# Patient Record
Sex: MALE | Race: WHITE | NOT HISPANIC OR LATINO | URBAN - NONMETROPOLITAN AREA
[De-identification: names, ages, dates, MRNs, and addresses within clinical notes are randomized per-mention and may not be internally consistent; named-entity substitution may affect disease eponyms.]

---

## 2017-01-10 ENCOUNTER — HOSPITAL ENCOUNTER (OUTPATIENT)
Dept: PHYSICAL THERAPY | Facility: HOSPITAL | Age: 57
Setting detail: THERAPIES SERIES
Discharge: HOME | End: 2017-01-20
Attending: ORTHOPAEDIC SURGERY | Admitting: ORTHOPAEDIC SURGERY

## 2017-08-09 ENCOUNTER — OFFICE VISIT (OUTPATIENT)
Dept: CARDIOLOGY | Age: 57
End: 2017-08-09
Payer: COMMERCIAL

## 2017-08-09 VITALS
WEIGHT: 254 LBS | DIASTOLIC BLOOD PRESSURE: 70 MMHG | HEIGHT: 70 IN | BODY MASS INDEX: 36.36 KG/M2 | HEART RATE: 72 BPM | SYSTOLIC BLOOD PRESSURE: 144 MMHG

## 2017-08-09 DIAGNOSIS — I10 ESSENTIAL HYPERTENSION: Primary | ICD-10-CM

## 2017-08-09 DIAGNOSIS — E78.2 MIXED HYPERLIPIDEMIA: ICD-10-CM

## 2017-08-09 PROCEDURE — 99213 OFFICE O/P EST LOW 20 MIN: CPT | Performed by: INTERNAL MEDICINE

## 2018-03-01 ENCOUNTER — TELEPHONE (OUTPATIENT)
Dept: CARDIOLOGY | Age: 58
End: 2018-03-01

## 2018-03-01 NOTE — TELEPHONE ENCOUNTER
LVM; Left vm to remind pt of appt on Friday, 3-2-2018 @ 9:45 am with Helen Dexter for a 6 month follow up.

## 2018-03-02 ENCOUNTER — OFFICE VISIT (OUTPATIENT)
Dept: CARDIOLOGY | Age: 58
End: 2018-03-02
Payer: COMMERCIAL

## 2018-03-02 VITALS
BODY MASS INDEX: 35.98 KG/M2 | HEART RATE: 70 BPM | DIASTOLIC BLOOD PRESSURE: 82 MMHG | WEIGHT: 257 LBS | HEIGHT: 71 IN | SYSTOLIC BLOOD PRESSURE: 138 MMHG

## 2018-03-02 DIAGNOSIS — I10 ESSENTIAL HYPERTENSION: Primary | ICD-10-CM

## 2018-03-02 PROCEDURE — 99213 OFFICE O/P EST LOW 20 MIN: CPT | Performed by: CLINICAL NURSE SPECIALIST

## 2018-03-02 PROCEDURE — 93000 ELECTROCARDIOGRAM COMPLETE: CPT | Performed by: CLINICAL NURSE SPECIALIST

## 2018-03-02 NOTE — PROGRESS NOTES
[facility], [address] [place] [address]  [address]  Phone: [phone]  Fax: [phone]    OFFICE VISIT:  3/2/2018    Marylen Rakes - : 1960    Reason For Visit:  Ebony Cristina is a 59 y.o. male who is here for 6 Month Follow-Up (No cardiac symptoms) and Hypertension  Negative stress echo in . Echo at that time showed mild diastolic dysfunction with normal LV systolic function. Mildly thickened mitral and aortic valve leaflets with preserved mobility    Subjective  Ebony Cristina denies exertional chest pain, shortness of breath, orthopnea, paroxysmal nocturnal dyspnea, syncope, presyncope, arrhythmia, edema and fatigue. The patient denies numbness or weakness to suggest cerebrovascular accident or transient ischemic attack. Wife is checked blood pressure over the last few days. 130s to 140s/ 70-80  He had a couple morning readings of 150s but came back down to normal  Thurabdulaziz Lee is PCP and follows labs. Marylen Rakes has the following history as recorded in [facility]:    Patient Active Problem List    Diagnosis Date Noted    Hypertension      Past Medical History:   Diagnosis Date    Dyspnea     at rest and with exertion     Hypertension     SOB (shortness of breath)      History reviewed. No pertinent surgical history.   Family History   Problem Relation Age of Onset    Heart Attack Father      x 3    High Cholesterol Father     Hypertension Father     Heart Attack Paternal Uncle      Social History   Substance Use Topics    Smoking status: Former Smoker    Smokeless tobacco: Former User     Quit date: 1986      Comment: quit 30 yrs ago    Alcohol use No      Current Outpatient Prescriptions   Medication Sig Dispense Refill    metoprolol (LOPRESSOR) 25 MG tablet Take 0.5 tablets by mouth 2 times daily 30 tablet 3    aspirin EC 81 MG EC tablet Take 1 tablet by mouth daily 30 tablet 3    simvastatin (ZOCOR) 10 MG tablet Take 10 mg by mouth nightly

## 2018-03-02 NOTE — PATIENT INSTRUCTIONS
Follow up in August With Dr. Cinthia Troy  Monitor blood pressure, goal at rest was 130s/80s  Call with any questions or concerns  Follow up with Ravindra De Souza for non cardiac problems  Report any new problems  Cardiovascular Fitness-Exercise as tolerated. Strive for 15 minutes of exercise most days of the week. Cardiac / Healthy Diet  Continue current medications as directed  Continue plan of treatment  It is always recommended that you bring your medications bottles with you to each visit - this is for your safety!

## 2018-07-02 ENCOUNTER — TELEPHONE (OUTPATIENT)
Dept: CARDIOLOGY | Age: 58
End: 2018-07-02

## 2018-07-02 NOTE — TELEPHONE ENCOUNTER
Unable to reach pt or leave a msg in regards to Dr Prem Kelly no longer in office and needing to reschedule w [facility] for a different day.  Will try to contact pt back the following day 7-3-2018  if pt does not call back.  phani

## 2018-07-03 ENCOUNTER — TELEPHONE (OUTPATIENT)
Dept: CARDIOLOGY | Age: 58
End: 2018-07-03

## 2018-07-03 NOTE — TELEPHONE ENCOUNTER
Unable to leave msg on pt phone. Called pt in regards to appt jose roberto Smith on 8-8-2018 needing to be rescheduled w PENNY due to [facility].  If pt does not call back today on 7-3-2018 will call pt back on 7-5-2018  phani

## 2018-07-09 ENCOUNTER — TELEPHONE (OUTPATIENT)
Dept: CARDIOLOGY | Age: 58
End: 2018-07-09

## 2018-08-16 ENCOUNTER — OFFICE VISIT (OUTPATIENT)
Dept: CARDIOLOGY | Age: 58
End: 2018-08-16
Payer: COMMERCIAL

## 2018-08-16 VITALS
SYSTOLIC BLOOD PRESSURE: 134 MMHG | DIASTOLIC BLOOD PRESSURE: 87 MMHG | HEIGHT: 71 IN | BODY MASS INDEX: 35.98 KG/M2 | HEART RATE: 68 BPM | WEIGHT: 257 LBS

## 2018-08-16 DIAGNOSIS — I10 ESSENTIAL HYPERTENSION: Primary | ICD-10-CM

## 2018-08-16 PROCEDURE — 99213 OFFICE O/P EST LOW 20 MIN: CPT | Performed by: CLINICAL NURSE SPECIALIST

## 2018-08-16 NOTE — PROGRESS NOTES
[facility], [address], [address] [address]  Phone: [phone]  Fax: [phone]    OFFICE VISIT:  2018    Kathy Russo - : 1960    Reason For Visit:  Radha Zapata is a 59 y.o. male who is here for Follow-up and Hypertension (no cardiac symtpoms today)  Negative stress echo in 2016. Echo at that time showed mild diastolic dysfunction with normal LV systolic function. Mildly thickened mitral and aortic valve leaflets with preserved mobility    Subjective  Radha Zapata denies exertional chest pain, shortness of breath, orthopnea, paroxysmal nocturnal dyspnea, syncope, presyncope, arrhythmia, edema and fatigue. The patient denies numbness or weakness to suggest cerebrovascular accident or transient ischemic attack. Sanket Alegria is PCP and follows labs including lipids. Kathy Russo has the following history as [facility] in [facility]:    Patient Active Problem List    Diagnosis Date Noted    Hypertension      Past Medical History:   Diagnosis Date    Dyspnea     at rest and with exertion     Hypertension     SOB (shortness of breath)      History reviewed. No pertinent surgical history. Family History   Problem Relation Age of Onset    Heart Attack Father         x 3    High Cholesterol Father     Hypertension Father     Heart Attack Paternal Uncle      Social History   Substance Use Topics    Smoking status: Former Smoker    Smokeless tobacco: Former User     Quit date: 1986      Comment: quit 30 yrs ago    Alcohol use No      Current Outpatient Prescriptions   Medication Sig Dispense Refill    metoprolol tartrate (LOPRESSOR) 25 MG tablet Take 0.5 tablets by mouth 2 times daily 90 tablet 3    aspirin EC 81 MG EC tablet Take 1 tablet by mouth daily 30 tablet 3    simvastatin (ZOCOR) 10 MG tablet Take 10 mg by mouth nightly       No current facility-administered medications for this visit.       Allergies: Patient has no known allergies. Review of Systems  Constitutional - no significant activity change, appetite change, or unexpected weight change. No fever, chills or diaphoresis. No fatigue. HEENT - no significant rhinorrhea or epistaxis. No tinnitus or significant hearing loss. Eyes - no sudden vision change or amaurosis. Respiratory - no significant wheezing, stridor, apnea or cough. No dyspnea on exertion or shortness of breath. Cardiovascular - no exertional chest pain, orthopnea or PND. No sensation of arrhythmia or slow heart rate. No claudication or leg edema. Gastrointestinal - no abdominal swelling or pain. No blood in stool. No severe constipation, diarrhea, nausea, or vomiting. Genitourinary - no difficulty urinating, dysuria, frequency, or urgency. No flank pain or hematuria. Musculoskeletal - no back pain, gait disturbance, or myalgia. Skin - no color change or rash. No pallor. No new surgical incision. Neurologic - no speech difficulty, facial asymmetry or lateralizing weakness. No seizures, presyncope, syncope, or significant dizziness. Hematologic - no easy bruising or excessive bleeding. Psychiatric - no severe anxiety or insomnia. No confusion. All other review of systems are negative. Objective  Vital Signs - /87   Pulse 68   Ht 5' 11\" (1.803 m)   Wt 257 lb (116.6 kg)   BMI 35.84 kg/m²   General - Kenny Helling is alert, cooperative, and pleasant. Well groomed. No acute distress. Body habitus is overweight. HEENT - The head is normocephalic. No circumoral cyanosis. Dentition is normal.   EYES -  No Xanthelasma, no arcus senilis, no conjunctival hemorrhages or discharge. Neck - Supple, without increased jugular venous pressures. No carotid bruits. No mass. Respiratory - Lungs are clear bilaterally. No wheezes or rales. Normal effort without use of accessory muscles. Cardiovascular - Heart has regular rhythm and rate. No murmurs, rubs or gallops.     + pedal pulses and no varicosities. Abdominal -  Soft, nontender, nondistended. Bowel sounds are intact. Extremities - No clubbing, cyanosis, or  edema. Musculoskeletal -  No clubbing . No Osler's nodes. Gait normal .  No kyphosis or scoliosis. Skin -  no statis ulcers or dermatitis. Neurological - No focal signs are identified. Oriented to person, place and time. Psychiatric -  Appropriate affect and mood. Assessment:     Diagnosis Orders   1. Essential hypertension       Data:  BP Readings from Last 3 Encounters:   08/16/18 134/87   03/02/18 138/82   08/09/17 (!) 144/70    Pulse Readings from Last 3 Encounters:   08/16/18 68   03/02/18 70   08/09/17 72        Blood pressure heart rate well controlled. Medical management included low-dose beta blocker, statin and aspirin   States taking medications as prescribed  Stable cardiovascular status. No evidence of overt heart failure, angina or dysrhythmia. Plan    Follow up in 1 year  Call with any questions or concerns  Follow up with Dg Fragmin for non cardiac problems  Report any new problems  Cardiovascular Fitness-Exercise as tolerated. Strive for 30 minutes of exercise most days of the week. Cardiac / Healthy Diet  Continue current medications as directed  Continue plan of treatment  It is always recommended that you bring your medications bottles with you to each visit - this is for your safety!        SHAMIKA Verduzco

## 2019-04-19 ENCOUNTER — TELEPHONE (OUTPATIENT)
Dept: CARDIOLOGY | Age: 59
End: 2019-04-19

## 2019-04-19 NOTE — TELEPHONE ENCOUNTER
Left message for patient to return call if they would be interested in scheduling their Cardiology appointment at the [facility]

## 2019-08-15 ENCOUNTER — OFFICE VISIT (OUTPATIENT)
Dept: CARDIOLOGY | Age: 59
End: 2019-08-15
Payer: COMMERCIAL

## 2019-08-15 VITALS
HEIGHT: 71 IN | SYSTOLIC BLOOD PRESSURE: 128 MMHG | HEART RATE: 67 BPM | WEIGHT: 253 LBS | BODY MASS INDEX: 35.42 KG/M2 | DIASTOLIC BLOOD PRESSURE: 84 MMHG

## 2019-08-15 DIAGNOSIS — I10 ESSENTIAL HYPERTENSION: Primary | ICD-10-CM

## 2019-08-15 DIAGNOSIS — I25.84 CORONARY ARTERY CALCIFICATION: ICD-10-CM

## 2019-08-15 DIAGNOSIS — Z82.49 FAMILY HISTORY OF EARLY CAD: ICD-10-CM

## 2019-08-15 DIAGNOSIS — I70.90 ATHEROSCLEROSIS OF ARTERIES: ICD-10-CM

## 2019-08-15 DIAGNOSIS — I25.10 CORONARY ARTERY CALCIFICATION: ICD-10-CM

## 2019-08-15 PROCEDURE — 93000 ELECTROCARDIOGRAM COMPLETE: CPT | Performed by: CLINICAL NURSE SPECIALIST

## 2019-08-15 PROCEDURE — 99214 OFFICE O/P EST MOD 30 MIN: CPT | Performed by: CLINICAL NURSE SPECIALIST

## 2019-08-15 NOTE — PROGRESS NOTES
or rales. Normal effort without use of accessory muscles. Cardiovascular - Heart has regular rhythm and rate. No murmurs, rubs or gallops. + pedal pulses and no varicosities. Abdominal -  Soft, nontender, nondistended. Bowel sounds are intact. Extremities - No clubbing, cyanosis, or  edema. Musculoskeletal -  No clubbing . No Osler's nodes. Gait normal .  No kyphosis or scoliosis. Skin -  no statis ulcers or dermatitis. Neurological - No focal signs are identified. Oriented to person, place and time. Psychiatric -  Appropriate affect and mood. Assessment:     Diagnosis Orders   1. Essential hypertension  EKG 12 lead    NM MYOCARDIAL SPECT REST EXERCISE OR RX   2. Atherosclerosis of arteries  NM MYOCARDIAL SPECT REST EXERCISE OR RX   3. Coronary artery calcification  NM MYOCARDIAL SPECT REST EXERCISE OR RX   4. Family history of early CAD  NM MYOCARDIAL SPECT REST EXERCISE OR RX     Data:  BP Readings from Last 3 Encounters:   08/15/19 128/84   08/16/18 134/87   03/02/18 138/82    Pulse Readings from Last 3 Encounters:   08/15/19 67   08/16/18 68   03/02/18 70        Wt Readings from Last 3 Encounters:   08/15/19 253 lb (114.8 kg)   08/16/18 257 lb (116.6 kg)   03/02/18 257 lb (116.6 kg)     EKG today shows a rhythm with a rate of 67. Blood pressure and heart rate are well controlled. Medical management includes beta-blocker, aspirin and statin    Patient had CT of chest and abdomen last year for some abdominal pain. Atherosclerosis what was moderately severe was noted. Significant risk factors for coronary disease including hypertension, hyperlipidemia and family history. Dad had MI in his 45s. Has been several years since we have done an ischemic study. We will get him set up for a nuclear treadmill. Answered all questions. We discussed the importance of staying active and letting us know if he has any change of activity tolerance or any symptoms.   Recommend obtaining

## 2019-08-15 NOTE — PATIENT INSTRUCTIONS
heart's activity. Your blood pressure and heart rate will be checked often. A small amount of chemical will be injected through the IV and into your body. Depending on which chemical is used, your heart will beat faster and/or the blood vessels near your heart will open wider. An ECG may also be done at this time. If you have chest pain, trouble breathing, dizziness, or any other symptoms, tell your doctor or technician right away. The test may need to be stopped. Changes in the ECG may also be a reason to stop the test.     If you are getting nuclear imaging, the technician will inject a mildly radioactive chemical through your IV. Thirty to sixty minutes after the chemical injection, a special camera or an MRI will be used to track the flow of the chemical through and around your heart. Images will be taken to find areas of the heart that may not be getting enough blood or are blocked. If you are getting a stress echocardiogram, an ultrasound will be taken at specific time points. The doctor will compare the pictures of your heart under stress with pictures of your heart at rest.     After Test   Your blood pressure, heart rate, and ECG will be monitored until levels return to normal. You will be able to leave after the test is done. How Long Will It Take? Typically takes 3-4 hours to complete (may be done over 1-2 days)     Will It Hurt? No, you should not feel pain during the test. You may feel a pinch when the IV is inserted. You may also feel a flushing sensation when the medicine is injected. Your doctor may discuss some of the results on the same day as the test. It may take 2-3 days for the full results to be ready.    One or more of the following are considered a positive stress test:   ECG changes that show low oxygen supply to the heart   Significant blood pressure drops or rhythm changes   You have chest pain or trouble breathing, especially if linked with ECG changes   Stress test pictures that show areas of your heart having low blood flow or abnormal movements     A positive test may mean that you have CAD. Not everyone tests positive for it. Based on your results, your doctor may recommend more tests or care. After the test, call your doctor if any of the following occurs:   Chest pain   Shortness of breath   Racing or irregular heart beat   Dizziness or weakness   Any other unusual symptoms or concerns     If you think you have an emergency, CALL 911 . Please be at [facility] for your stress test.  If you need to change this appointment, please call [phone] to reschedule.

## 2019-09-11 ENCOUNTER — HOSPITAL ENCOUNTER (OUTPATIENT)
Dept: NON INVASIVE DIAGNOSTICS | Age: 59
Discharge: HOME | End: 2019-09-11
Payer: COMMERCIAL

## 2019-09-11 ENCOUNTER — HOSPITAL ENCOUNTER (OUTPATIENT)
Dept: NUCLEAR MEDICINE | Age: 59
Discharge: HOME | End: 2019-09-13
Payer: COMMERCIAL

## 2019-09-11 DIAGNOSIS — I10 ESSENTIAL HYPERTENSION: ICD-10-CM

## 2019-09-11 DIAGNOSIS — I25.84 CORONARY ARTERY CALCIFICATION: ICD-10-CM

## 2019-09-11 DIAGNOSIS — I25.10 CORONARY ARTERY CALCIFICATION: ICD-10-CM

## 2019-09-11 DIAGNOSIS — I70.90 ATHEROSCLEROSIS OF ARTERIES: ICD-10-CM

## 2019-09-11 DIAGNOSIS — Z82.49 FAMILY HISTORY OF EARLY CAD: ICD-10-CM

## 2019-09-11 PROCEDURE — 3430000000 HC RX DIAGNOSTIC RADIOPHARMACEUTICAL: Performed by: CLINICAL NURSE SPECIALIST

## 2019-09-11 PROCEDURE — A9500 TC99M SESTAMIBI: HCPCS | Performed by: CLINICAL NURSE SPECIALIST

## 2019-09-11 PROCEDURE — 78452 HT MUSCLE IMAGE SPECT MULT: CPT

## 2019-09-11 PROCEDURE — 93017 CV STRESS TEST TRACING ONLY: CPT

## 2019-09-11 RX ADMIN — TETRAKIS(2-METHOXYISOBUTYLISOCYANIDE)COPPER(I) TETRAFLUOROBORATE 30 MILLICURIE: 1 INJECTION, POWDER, LYOPHILIZED, FOR SOLUTION INTRAVENOUS at 16:25

## 2019-09-11 RX ADMIN — TETRAKIS(2-METHOXYISOBUTYLISOCYANIDE)COPPER(I) TETRAFLUOROBORATE 10 MILLICURIE: 1 INJECTION, POWDER, LYOPHILIZED, FOR SOLUTION INTRAVENOUS at 16:24

## 2019-09-13 LAB
LV EF: 71 %
LVEF MODALITY: NORMAL

## 2019-09-16 ENCOUNTER — TELEPHONE (OUTPATIENT)
Dept: CARDIOLOGY | Age: 59
End: 2019-09-16

## 2020-08-19 ENCOUNTER — OFFICE VISIT (OUTPATIENT)
Dept: CARDIOLOGY | Age: 60
End: 2020-08-19
Payer: COMMERCIAL

## 2020-08-19 VITALS
WEIGHT: 254 LBS | DIASTOLIC BLOOD PRESSURE: 80 MMHG | HEIGHT: 71 IN | BODY MASS INDEX: 35.56 KG/M2 | HEART RATE: 80 BPM | SYSTOLIC BLOOD PRESSURE: 130 MMHG

## 2020-08-19 PROCEDURE — 99214 OFFICE O/P EST MOD 30 MIN: CPT | Performed by: INTERNAL MEDICINE

## 2020-08-19 RX ORDER — METOPROLOL SUCCINATE 50 MG/1
50 TABLET, EXTENDED RELEASE ORAL DAILY
Qty: 90 TABLET | Refills: 3 | Status: SHIPPED | OUTPATIENT
Start: 2020-08-19

## 2020-08-19 ASSESSMENT — ENCOUNTER SYMPTOMS
COUGH: 0
SHORTNESS OF BREATH: 0
ABDOMINAL PAIN: 0
DIARRHEA: 0
VOMITING: 0
ABDOMINAL DISTENTION: 0
BACK PAIN: 0
WHEEZING: 0
BLOOD IN STOOL: 0

## 2020-08-19 NOTE — PROGRESS NOTES
Laterality Date    EYE SURGERY Right        Medications:  Current Outpatient Medications   Medication Sig Dispense Refill    metoprolol succinate (TOPROL XL) 50 MG extended release tablet Take 1 tablet by mouth daily 90 tablet 3    aspirin EC 81 MG EC tablet Take 1 tablet by mouth daily 30 tablet 3    simvastatin (ZOCOR) 10 MG tablet Take 10 mg by mouth nightly       No current facility-administered medications for this visit. Allergies:  Patient has no known allergies.     Past Social History:  Social History     Socioeconomic History    Marital status:      Spouse name: Not on file    Number of children: Not on file    Years of education: Not on file    Highest education level: Not on file   Occupational History    Not on file   Social Needs    Financial resource strain: Not on file    Food insecurity     Worry: Not on file     Inability: Not on file    Transportation needs     Medical: Not on file     Non-medical: Not on file   Tobacco Use    Smoking status: Former Smoker    Smokeless tobacco: Former User     Quit date: 6/29/1986    Tobacco comment: quit 30 yrs ago   Substance and Sexual Activity    Alcohol use: No    Drug use: No    Sexual activity: Not Currently   Lifestyle    Physical activity     Days per week: Not on file     Minutes per session: Not on file    Stress: Not on file   Relationships    Social connections     Talks on phone: Not on file     Gets together: Not on file     Attends Yazidi service: Not on file     Active member of club or organization: Not on file     Attends meetings of clubs or organizations: Not on file     Relationship status: Not on file    Intimate partner violence     Fear of current or ex partner: Not on file     Emotionally abused: Not on file     Physically abused: Not on file     Forced sexual activity: Not on file   Other Topics Concern    Not on file   Social History Narrative    Not on file       Family History:       Problem Relation Age of Onset    Heart Attack Father 36        x 3    High Cholesterol Father     Hypertension Father     Heart Attack Paternal Uncle          Physical Examination:  /80   Pulse 80   Ht 5' 11\" (1.803 m)   Wt 254 lb (115.2 kg)   BMI 35.43 kg/m²   Physical Exam  Constitutional:       Appearance: He is well-developed. Comments: Moderate truncal obesity  Initial blood pressure 160/100 mmHg  Repeat blood pressure 140/90 mmHg  Pulse 76 bpm regular   HENT:      Mouth/Throat:      Pharynx: No oropharyngeal exudate. Eyes:      General: No scleral icterus. Right eye: No discharge. Left eye: No discharge. Neck:      Thyroid: No thyromegaly. Vascular: No JVD. Cardiovascular:      Rate and Rhythm: Normal rate and regular rhythm. Heart sounds: No murmur. No friction rub. No gallop. Comments: No JVD  No edema  No significant systolic or diastolic murmurs appreciated  No gallop noted  Pulmonary:      Effort: No respiratory distress. Breath sounds: No stridor. No wheezing or rales. Comments: Good air entry bilaterally with no crepitations or wheezes  Abdominal:      General: Bowel sounds are normal. There is no distension. Palpations: Abdomen is soft. There is no mass. Tenderness: There is no abdominal tenderness. There is no guarding or rebound. Comments: No palpable organomegaly   Musculoskeletal:         General: No deformity. Skin:     General: Skin is warm. Coloration: Skin is not pale. Findings: No erythema or rash. Neurological:      Mental Status: He is alert and oriented to person, place, and time. Motor: No abnormal muscle tone. Coordination: Coordination normal.      Deep Tendon Reflexes: Reflexes normal.           Labs:   CBC: No results for input(s): WBC, HGB, HCT, PLT in the last 72 hours. BMP:No results for input(s): NA, K, CO2, BUN, CREATININE, LABGLOM, GLUCOSE in the last 72 hours.   BNP: No results for input(s): BNP in the last 72 hours. PT/INR: No results for input(s): PROTIME, INR in the last 72 hours. APTT:No results for input(s): APTT in the last 72 hours. CARDIAC ENZYMES:No results for input(s): CKTOTAL, CKMB, CKMBINDEX, TROPONINI in the last 72 hours. FASTING LIPID PANEL:No results found for: HDL, LDLDIRECT, LDLCALC, TRIG  LIVER PROFILE:No results for input(s): AST, ALT, LABALBU in the last 72 hours. Imaging:          ASSESSMENT and PLAN:    77-year-old gentleman with past medical history of hypertension, normal LV ejection fraction, family history of premature CAD presenting for follow-up evaluation. 1.  His blood pressure appears slightly elevated on this visit. He is taking metoprolol tartrate 12.5 mg once daily. I have switched this to Toprol-XL 50 mg once daily. If he does notice that his pressures are low or he is fatigued he may reduce to 25 mg once daily. 2.  Continue statin therapy. 3.  He will follow-up with me in 1 year. Orders:  No orders of the defined types were placed in this encounter. Orders Placed This Encounter   Medications    metoprolol succinate (TOPROL XL) 50 MG extended release tablet     Sig: Take 1 tablet by mouth daily     Dispense:  90 tablet     Refill:  3             Return in about 1 year (around 8/19/2021). Electronically signed by Sarah Calvin MD on 8/19/2020 at 9:20 [address] [facility]      Thisdictation was generated by voice recognition computer software. Although all attempts are made to edit the dictation for accuracy, there may be errors in the transcription that are not intended.

## 2021-08-20 ENCOUNTER — OFFICE VISIT (OUTPATIENT)
Dept: CARDIOLOGY CLINIC | Age: 61
End: 2021-08-20
Payer: COMMERCIAL

## 2021-08-20 VITALS
HEIGHT: 71 IN | BODY MASS INDEX: 35.14 KG/M2 | SYSTOLIC BLOOD PRESSURE: 138 MMHG | WEIGHT: 251 LBS | HEART RATE: 68 BPM | DIASTOLIC BLOOD PRESSURE: 80 MMHG

## 2021-08-20 DIAGNOSIS — Z82.49 FAMILY HISTORY OF EARLY CAD: ICD-10-CM

## 2021-08-20 DIAGNOSIS — I10 ESSENTIAL HYPERTENSION: Primary | ICD-10-CM

## 2021-08-20 PROCEDURE — 99213 OFFICE O/P EST LOW 20 MIN: CPT | Performed by: CLINICAL NURSE SPECIALIST

## 2021-08-20 PROCEDURE — 93000 ELECTROCARDIOGRAM COMPLETE: CPT | Performed by: CLINICAL NURSE SPECIALIST

## 2021-08-20 NOTE — PROGRESS NOTES
[address] [address], [address] [address]  Phone: [phone]  Fax: [phone]    OFFICE VISIT:  2021    Lilly [place] - : 1960    Reason For Visit:  Nelson Kocher is a 56 y.o. male who is here for Follow-up (no cardiac symptoms) and Hypertension  1. Hypertension, negative stress test 2019, normal LV ejection fraction. 2.  Family history of premature CAD involving his father with MI in his 45s. Last year beta-blocker was uptitrated due to some hypertension. He returns today for follow-up  As he is active and doing well. 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 No change in stamina or activity tolerance. Subjective  Nelson Kocher denies exertional chest pain, shortness of breath, orthopnea, paroxysmal nocturnal dyspnea, syncope, presyncope, arrhythmia, edema and fatigue. The patient denies numbness or weakness to suggest cerebrovascular accident or transient ischemic attack. Brown Cunha is PCP and follows labs including lipids. States have been well controlled.   Lilly [place] [facility] the [email or web address]:    Patient Active Problem List    Diagnosis Date Noted    Hypertension      Past Medical History:   Diagnosis Date    Dyspnea     at rest and with exertion     Hypertension     SOB (shortness of breath)      Past Surgical History:   Procedure Laterality Date    EYE SURGERY Right      Family History   Problem Relation Age of Onset    Heart Attack Father 36        x 3    High Cholesterol Father     Hypertension Father     Heart Attack Paternal Uncle      Social History     Tobacco Use    Smoking status: Former Smoker    Smokeless tobacco: Former User     Quit date: 1986    Tobacco comment: quit 30 yrs ago   Substance Use Topics    Alcohol use: No      Current Outpatient Medications   Medication Sig Dispense Refill    metoprolol succinate (TOPROL XL) 50 MG extended release tablet Take 1 tablet by mouth daily 90 tablet 3    aspirin EC 81 MG EC tablet Take 1 tablet by mouth daily 30 tablet 3    simvastatin (ZOCOR) 10 MG tablet Take 10 mg by mouth nightly       No current facility-administered medications for this visit. Allergies: Patient has no known allergies. Review of Systems  Constitutional - no significant activity change, appetite change, or unexpected weight change. No fever, chills or diaphoresis. No fatigue. HEENT - no significant rhinorrhea or epistaxis. No tinnitus or significant hearing loss. Eyes - no sudden vision change or amaurosis. Respiratory - no significant wheezing, stridor, apnea or cough. No dyspnea on exertion or shortness of breath. Cardiovascular - no exertional chest pain, orthopnea or PND. No sensation of arrhythmia or slow heart rate. No claudication or leg edema. Gastrointestinal - no abdominal swelling or pain. No blood in stool. No severe constipation, diarrhea, nausea, or vomiting. Genitourinary - no difficulty urinating, dysuria, frequency, or urgency. No flank pain or hematuria. Musculoskeletal - no back pain, gait disturbance, or myalgia. Skin - no color change or rash. No pallor. No new surgical incision. Neurologic - no speech difficulty, facial asymmetry or lateralizing weakness. No seizures, presyncope, syncope, or significant dizziness. Hematologic - no easy bruising or excessive bleeding. Psychiatric - no severe anxiety or insomnia. No confusion. All other review of systems are negative. Objective  Vital Signs - /80   Pulse 68   Ht 5' 11\" (1.803 m)   Wt 251 lb (113.9 kg)   BMI 35.01 kg/m²   General - Alisha Camp is alert, cooperative, and pleasant. Well groomed. No acute distress. Body habitus is overweight. HEENT - The head is normocephalic. No circumoral cyanosis. Dentition is normal.   EYES -  No Xanthelasma, no arcus senilis, no conjunctival hemorrhages or discharge. Neck - Supple, without increased jugular venous pressures.   No carotid Diet  Continue current medications as directed  Continue plan of treatment  It is always recommended that you bring your medications bottles with you to each visit - this is for your safety! SHAMIKA Ramirez dragon/transcription disclaimer: Much of this encounter note is electronic transcription/translation of spoken language to printed tach. Electronic translation of spoken language may be erroneous, or at times, nonsensical words or phrases may be inadvertently transcribed.  Although, I have reviewed the note for such errors, some may still exist.

## 2022-08-24 ENCOUNTER — OFFICE VISIT (OUTPATIENT)
Dept: CARDIOLOGY CLINIC | Age: 62
End: 2022-08-24
Payer: COMMERCIAL

## 2022-08-24 VITALS
BODY MASS INDEX: 35 KG/M2 | WEIGHT: 250 LBS | HEART RATE: 72 BPM | HEIGHT: 71 IN | DIASTOLIC BLOOD PRESSURE: 64 MMHG | SYSTOLIC BLOOD PRESSURE: 118 MMHG

## 2022-08-24 DIAGNOSIS — I10 ESSENTIAL HYPERTENSION: Primary | ICD-10-CM

## 2022-08-24 PROCEDURE — 99213 OFFICE O/P EST LOW 20 MIN: CPT | Performed by: INTERNAL MEDICINE

## 2022-08-24 ASSESSMENT — ENCOUNTER SYMPTOMS
WHEEZING: 0
SHORTNESS OF BREATH: 0
ABDOMINAL DISTENTION: 0
COUGH: 0
VOMITING: 0
ABDOMINAL PAIN: 0
BLOOD IN STOOL: 0
BACK PAIN: 0
DIARRHEA: 0

## 2022-08-24 NOTE — PROGRESS NOTES
[facility]  Cardiology Office Note  [address] [id number]  Phone: [phone]  Fax: [phone]                            Date:  8/24/2022  Patient: Marjan Martinez  Age:  59 y.o., 1960    Referral: No ref. provider found      PROBLEM LIST:    Patient Active Problem List    Diagnosis Date Noted    Hypertension      Priority: Low     1. Hypertension, negative stress test 9/2019, normal LV ejection fraction. 2.  Family history of premature CAD involving his father with MI in his 45s. PRESENTATION: Marjan Martinez is a 59y.o. year old male presents for follow-up evaluation. He has been doing well with no issues over the last year. 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 He does have an air conditioned tractor as he is out in the heat. His weight has been fairly stable. Dietary portions could be reduced. No leg swelling, chest pain or shortness of breath. REVIEW OF SYSTEMS:  Review of Systems   Constitutional:  Negative for activity change, diaphoresis and fatigue. HENT:  Negative for hearing loss, nosebleeds and tinnitus. Eyes:  Negative for visual disturbance. Respiratory:  Negative for cough, shortness of breath and wheezing. Cardiovascular:  Negative for chest pain, palpitations and leg swelling. Gastrointestinal:  Negative for abdominal distention, abdominal pain, blood in stool, diarrhea and vomiting. Endocrine: Negative for cold intolerance, heat intolerance, polydipsia, polyphagia and polyuria. Genitourinary:  Negative for difficulty urinating, flank pain and hematuria. Musculoskeletal:  Negative for arthralgias, back pain, joint swelling and myalgias. Skin:  Negative for pallor and rash. Neurological:  Negative for dizziness, seizures, syncope and headaches. Psychiatric/Behavioral:  Negative for behavioral problems and dysphoric mood. The patient is not nervous/anxious.       Past Medical History:      Diagnosis Date    Dyspnea     at rest and with exertion     Hypertension     SOB (shortness of breath)        Past Surgical History:      Procedure Laterality Date    EYE SURGERY Right        Medications:  Current Outpatient Medications   Medication Sig Dispense Refill    metoprolol succinate (TOPROL XL) 50 MG extended release tablet Take 1 tablet by mouth daily 90 tablet 3    aspirin EC 81 MG EC tablet Take 1 tablet by mouth daily 30 tablet 3    simvastatin (ZOCOR) 10 MG tablet Take 10 mg by mouth nightly       No current facility-administered medications for this visit. Allergies:  Patient has no known allergies.     Past Social History:  Social History     Socioeconomic History    Marital status:      Spouse name: Not on file    Number of children: Not on file    Years of education: Not on file    Highest education level: Not on file   Occupational History    Not on file   Tobacco Use    Smoking status: Former    Smokeless tobacco: Former     Quit date: 6/29/1986    Tobacco comments:     quit 30 yrs ago   Vaping Use    Vaping Use: Never used   Substance and Sexual Activity    Alcohol use: No    Drug use: No    Sexual activity: Not Currently   Other Topics Concern    Not on file   Social History Narrative    Not on file     Social Determinants of Health     Financial Resource Strain: Not on file   Food Insecurity: Not on file   Transportation Needs: Not on file   Physical Activity: Not on file   Stress: Not on file   Social Connections: Not on file   Intimate Partner Violence: Not on file   Housing Stability: Not on file       Family History:       Problem Relation Age of Onset    Heart Attack Father 40        x 3    High Cholesterol Father     Hypertension Father     Heart Attack Paternal Uncle          Physical Examination:  /64   Pulse 72   Ht 5' 11\" (1.803 m)   Wt 250 lb (113.4 kg)   BMI 34.87 kg/m²   Physical Exam  Constitutional:       Comments: Moderate truncal obesity  Blood pressure left arm sitting 136/70 mmHg, pulse 68 bpm regular   HENT:      Mouth/Throat:      Pharynx: No oropharyngeal exudate. Eyes:      General: No scleral icterus. Right eye: No discharge. Left eye: No discharge. Neck:      Thyroid: No thyromegaly. Vascular: No JVD. Cardiovascular:      Rate and Rhythm: Normal rate and regular rhythm. Heart sounds: No murmur heard. No friction rub. No gallop. Comments: No JVD  No edema  No significant systolic or diastolic murmurs noted  Pulmonary:      Effort: No respiratory distress. Breath sounds: No stridor. No wheezing or rales. Abdominal:      General: Bowel sounds are normal. There is no distension. Palpations: Abdomen is soft. There is no mass. Tenderness: There is no abdominal tenderness. There is no guarding or rebound. Comments: No palpable organomegaly   Musculoskeletal:         General: No deformity. Skin:     General: Skin is warm. Coloration: Skin is not pale. Findings: No erythema or rash. Neurological:      Mental Status: He is alert and oriented to person, place, and time. Motor: No abnormal muscle tone. Coordination: Coordination normal.      Deep Tendon Reflexes: Reflexes normal.         Labs:   CBC: No results for input(s): WBC, HGB, HCT, PLT in the last 72 hours. BMP:No results for input(s): NA, K, CO2, BUN, CREATININE, LABGLOM, GLUCOSE in the last 72 hours. BNP: No results for input(s): BNP in the last 72 hours. PT/INR: No results for input(s): PROTIME, INR in the last 72 hours. APTT:No results for input(s): APTT in the last 72 hours. CARDIAC ENZYMES:No results for input(s): CKTOTAL, CKMB, CKMBINDEX, TROPONINI in the last 72 hours. FASTING LIPID PANEL:No results found for: HDL, LDLDIRECT, LDLCALC, TRIG  LIVER PROFILE:No results for input(s): AST, ALT, LABALBU in the last 72 hours.         Imaging:          ASSESSMENT and PLAN:    38-year-old gentleman with past medical history of hypertension, normal LV ejection fraction, family history of premature CAD presenting for follow-up evaluation. 1.  He is doing well with no significant issues. Blood pressure appears much better controlled. Continue current medications with Toprol-XL. He remains active. He could make an effort in terms of weight reduction through dietary intervention. I have discussed this with him. Portion control, caloric restriction as well as avoidance of snacks. 2.  Can follow-up with me in 1 year. Orders:  No orders of the defined types were placed in this encounter. No orders of the defined types were placed in this encounter. Return for me 1 year. Electronically signed by Dov Mckee MD on 8/24/2022 at 10:34 AM    [facility]      Thisdictation was generated by voice recognition computer software. Although all attempts are made to edit the dictation for accuracy, there may be errors in the transcription that are not intended.

## 2023-08-25 ENCOUNTER — OFFICE VISIT (OUTPATIENT)
Dept: CARDIOLOGY CLINIC | Age: 63
End: 2023-08-25

## 2023-08-25 VITALS
HEART RATE: 74 BPM | HEIGHT: 71 IN | DIASTOLIC BLOOD PRESSURE: 80 MMHG | WEIGHT: 246 LBS | BODY MASS INDEX: 34.44 KG/M2 | SYSTOLIC BLOOD PRESSURE: 136 MMHG

## 2023-08-25 DIAGNOSIS — I10 PRIMARY HYPERTENSION: Primary | ICD-10-CM

## 2023-08-25 RX ORDER — EVOLOCUMAB 140 MG/ML
140 INJECTION, SOLUTION SUBCUTANEOUS
COMMUNITY
Start: 2023-08-18

## 2023-08-25 ASSESSMENT — ENCOUNTER SYMPTOMS
ABDOMINAL DISTENTION: 0
BACK PAIN: 0
BLOOD IN STOOL: 0
SHORTNESS OF BREATH: 0
ABDOMINAL PAIN: 0
DIARRHEA: 0
COUGH: 0
WHEEZING: 0
VOMITING: 0

## 2024-09-17 ENCOUNTER — TELEPHONE (OUTPATIENT)
Dept: NEUROSURGERY | Age: 64
End: 2024-09-17

## 2024-09-18 ENCOUNTER — OFFICE VISIT (OUTPATIENT)
Dept: CARDIOLOGY CLINIC | Age: 64
End: 2024-09-18
Payer: COMMERCIAL

## 2024-09-18 VITALS
HEIGHT: 70 IN | DIASTOLIC BLOOD PRESSURE: 98 MMHG | HEART RATE: 69 BPM | SYSTOLIC BLOOD PRESSURE: 164 MMHG | BODY MASS INDEX: 35.65 KG/M2 | WEIGHT: 249 LBS

## 2024-09-18 DIAGNOSIS — R06.02 SHORTNESS OF BREATH: ICD-10-CM

## 2024-09-18 DIAGNOSIS — I10 PRIMARY HYPERTENSION: Primary | ICD-10-CM

## 2024-09-18 PROCEDURE — 1123F ACP DISCUSS/DSCN MKR DOCD: CPT | Performed by: INTERNAL MEDICINE

## 2024-09-18 PROCEDURE — 99214 OFFICE O/P EST MOD 30 MIN: CPT | Performed by: INTERNAL MEDICINE

## 2024-09-18 PROCEDURE — 3080F DIAST BP >= 90 MM HG: CPT | Performed by: INTERNAL MEDICINE

## 2024-09-18 PROCEDURE — 93000 ELECTROCARDIOGRAM COMPLETE: CPT | Performed by: INTERNAL MEDICINE

## 2024-09-18 PROCEDURE — 3077F SYST BP >= 140 MM HG: CPT | Performed by: INTERNAL MEDICINE

## 2024-09-18 RX ORDER — LOSARTAN POTASSIUM 50 MG/1
50 TABLET ORAL DAILY
COMMUNITY
Start: 2024-07-10

## 2024-09-18 ASSESSMENT — ENCOUNTER SYMPTOMS
ABDOMINAL PAIN: 0
BLOOD IN STOOL: 0
VOMITING: 0
COUGH: 0
WHEEZING: 0
ABDOMINAL DISTENTION: 0
SHORTNESS OF BREATH: 0
BACK PAIN: 0
DIARRHEA: 0

## 2024-10-17 ENCOUNTER — OFFICE VISIT (OUTPATIENT)
Dept: NEUROSURGERY | Age: 64
End: 2024-10-17
Payer: MEDICARE

## 2024-10-17 VITALS
SYSTOLIC BLOOD PRESSURE: 134 MMHG | HEIGHT: 70 IN | HEART RATE: 83 BPM | BODY MASS INDEX: 35.63 KG/M2 | DIASTOLIC BLOOD PRESSURE: 72 MMHG | WEIGHT: 248.9 LBS

## 2024-10-17 DIAGNOSIS — M50.30 DDD (DEGENERATIVE DISC DISEASE), CERVICAL: ICD-10-CM

## 2024-10-17 DIAGNOSIS — M54.2 NECK PAIN: ICD-10-CM

## 2024-10-17 DIAGNOSIS — M48.02 CERVICAL STENOSIS OF SPINAL CANAL: Primary | ICD-10-CM

## 2024-10-17 DIAGNOSIS — M48.02 FORAMINAL STENOSIS OF CERVICAL REGION: ICD-10-CM

## 2024-10-17 PROCEDURE — 99204 OFFICE O/P NEW MOD 45 MIN: CPT | Performed by: NEUROLOGICAL SURGERY

## 2024-10-17 PROCEDURE — 3017F COLORECTAL CA SCREEN DOC REV: CPT | Performed by: NEUROLOGICAL SURGERY

## 2024-10-17 PROCEDURE — 1036F TOBACCO NON-USER: CPT | Performed by: NEUROLOGICAL SURGERY

## 2024-10-17 PROCEDURE — 3075F SYST BP GE 130 - 139MM HG: CPT | Performed by: NEUROLOGICAL SURGERY

## 2024-10-17 PROCEDURE — 1123F ACP DISCUSS/DSCN MKR DOCD: CPT | Performed by: NEUROLOGICAL SURGERY

## 2024-10-17 PROCEDURE — G8417 CALC BMI ABV UP PARAM F/U: HCPCS | Performed by: NEUROLOGICAL SURGERY

## 2024-10-17 PROCEDURE — G8484 FLU IMMUNIZE NO ADMIN: HCPCS | Performed by: NEUROLOGICAL SURGERY

## 2024-10-17 PROCEDURE — G8427 DOCREV CUR MEDS BY ELIG CLIN: HCPCS | Performed by: NEUROLOGICAL SURGERY

## 2024-10-17 PROCEDURE — 3078F DIAST BP <80 MM HG: CPT | Performed by: NEUROLOGICAL SURGERY

## 2024-10-17 RX ORDER — IBUPROFEN 800 MG/1
800 TABLET, FILM COATED ORAL EVERY 6 HOURS PRN
COMMUNITY

## 2024-10-17 RX ORDER — METHOCARBAMOL 750 MG/1
750 TABLET, FILM COATED ORAL 3 TIMES DAILY PRN
Qty: 90 TABLET | Refills: 1 | Status: SHIPPED | OUTPATIENT
Start: 2024-10-17 | End: 2024-12-16

## 2024-10-17 ASSESSMENT — ENCOUNTER SYMPTOMS
RESPIRATORY NEGATIVE: 1
GASTROINTESTINAL NEGATIVE: 1
EYES NEGATIVE: 1

## 2024-10-17 NOTE — PROGRESS NOTES
Review of Systems   Constitutional: Negative.    HENT: Negative.     Eyes: Negative.    Respiratory: Negative.     Cardiovascular: Negative.    Gastrointestinal: Negative.    Genitourinary: Negative.    Musculoskeletal:  Positive for joint pain, myalgias and neck pain.   Skin: Negative.    Endo/Heme/Allergies: Negative.    Psychiatric/Behavioral: Negative.

## 2024-10-17 NOTE — PROGRESS NOTES
[facility]  Office Visit      Chief Complaint   Patient presents with    New Patient     Establishing care    Results     Imaging in pacs    Neck Pain     Patient states he has had neck pain for several years and is worsening now. He feels pain in the base of his neck and radiates into his shoulders. She states looking left to right worsens his pain. Patient is taking Ibuprofen for the pain. Patient has not tried PT or pain management.    Numbness     Patient denies any numbness.       HISTORY OF PRESENT ILLNESS:    Norm Whitlock is a 64 y.o. male former  who presents with neck pain for many years.  He states he can deal with the pain but it is annoying.  The pain does not radiate. He can have some pain just above the shoulders.  His pain is mostly located in the neck.  The patient denies numbness.  He does not have numbness in the fingertips, trouble using hands to perform fine motor tasks or ataxia.    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       The patient has underwent a non-operative treatment course that has included:  NSAIDs - ibuprofen    Of note he does not use tobacco and does not take blood thinning medications               Past Medical History:   Diagnosis Date    Dyspnea     at rest and with exertion     Hypertension     SOB (shortness of breath)        Past Surgical History:   Procedure Laterality Date    EYE SURGERY Right        Current Outpatient Medications   Medication Sig Dispense Refill    ibuprofen (ADVIL;MOTRIN) 800 MG tablet Take 1 tablet by mouth every 6 hours as needed for Pain      methocarbamol (ROBAXIN-750) 750 MG tablet Take 1 tablet by mouth 3 times daily as needed (spasms) 90 tablet 1    losartan (COZAAR) 50 MG tablet Take 1 tablet by mouth daily      REPATHA SURECLICK 140 MG/ML SOAJ Inject 140 mg into the skin every 14 days      metoprolol succinate (TOPROL XL) 50 MG extended release tablet Take 1 tablet by mouth daily 90 tablet 3     No current

## 2025-01-21 ENCOUNTER — OFFICE VISIT (OUTPATIENT)
Dept: NEUROSURGERY | Age: 65
End: 2025-01-21
Payer: MEDICARE

## 2025-01-21 VITALS
BODY MASS INDEX: 35.5 KG/M2 | RESPIRATION RATE: 18 BRPM | SYSTOLIC BLOOD PRESSURE: 133 MMHG | WEIGHT: 248 LBS | HEIGHT: 70 IN | HEART RATE: 71 BPM | DIASTOLIC BLOOD PRESSURE: 72 MMHG

## 2025-01-21 DIAGNOSIS — M48.02 CERVICAL STENOSIS OF SPINAL CANAL: Primary | ICD-10-CM

## 2025-01-21 DIAGNOSIS — M50.30 DDD (DEGENERATIVE DISC DISEASE), CERVICAL: ICD-10-CM

## 2025-01-21 DIAGNOSIS — M54.2 NECK PAIN: ICD-10-CM

## 2025-01-21 DIAGNOSIS — M48.02 FORAMINAL STENOSIS OF CERVICAL REGION: ICD-10-CM

## 2025-01-21 PROCEDURE — 99213 OFFICE O/P EST LOW 20 MIN: CPT | Performed by: NURSE PRACTITIONER

## 2025-01-21 PROCEDURE — 1159F MED LIST DOCD IN RCRD: CPT | Performed by: NURSE PRACTITIONER

## 2025-01-21 PROCEDURE — 3078F DIAST BP <80 MM HG: CPT | Performed by: NURSE PRACTITIONER

## 2025-01-21 PROCEDURE — 1123F ACP DISCUSS/DSCN MKR DOCD: CPT | Performed by: NURSE PRACTITIONER

## 2025-01-21 PROCEDURE — G8427 DOCREV CUR MEDS BY ELIG CLIN: HCPCS | Performed by: NURSE PRACTITIONER

## 2025-01-21 PROCEDURE — 3017F COLORECTAL CA SCREEN DOC REV: CPT | Performed by: NURSE PRACTITIONER

## 2025-01-21 PROCEDURE — G8417 CALC BMI ABV UP PARAM F/U: HCPCS | Performed by: NURSE PRACTITIONER

## 2025-01-21 PROCEDURE — 3075F SYST BP GE 130 - 139MM HG: CPT | Performed by: NURSE PRACTITIONER

## 2025-01-21 PROCEDURE — 1036F TOBACCO NON-USER: CPT | Performed by: NURSE PRACTITIONER

## 2025-01-21 RX ORDER — METHOCARBAMOL 750 MG/1
750 TABLET, FILM COATED ORAL 3 TIMES DAILY
COMMUNITY
Start: 2025-01-09

## 2025-01-21 ASSESSMENT — ENCOUNTER SYMPTOMS
EYES NEGATIVE: 1
GASTROINTESTINAL NEGATIVE: 1
RESPIRATORY NEGATIVE: 1

## 2025-01-21 NOTE — PROGRESS NOTES
Review of Systems   Constitutional: Negative.    HENT: Negative.     Eyes: Negative.    Respiratory: Negative.     Cardiovascular: Negative.    Gastrointestinal: Negative.    Genitourinary: Negative.    Musculoskeletal:  Positive for myalgias and neck pain.   Skin: Negative.    Neurological:  Positive for tingling.   Endo/Heme/Allergies: Negative.    Psychiatric/Behavioral: Negative.

## 2025-01-21 NOTE — PROGRESS NOTES
[facility]  Office Visit      Chief Complaint   Patient presents with    Follow-up     Eval cervical pathology. Patient states that the robaxin has helped tremendously.    Neck Pain     Patient states that the neck pain radiates down his bilateral shoulders.      Numbness     Patient denies any numbness tingling or weakness.        HISTORY OF PRESENT ILLNESS:    Norm Whitlock is a 64 y.o. male former  who we initially evaluated on 10/17/2024 with neck pain for many years.  He states he can deal with the pain but it is annoying.  The pain did not radiate. He can have some pain just above the shoulders.  His pain was mostly located in the neck.  Denied numbness.  He did not have numbness in the fingertips, trouble using hands to perform fine motor tasks or ataxia.  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       Today he returns for routine follow up. He states the Robaxin has helped tremendously, he really just has a sore neck.  He takes just one at night.  He continues to deny any radicular arm pains, numbness, or paresthesias.  States his discomfort at this time is \"liveable\".      He does also mention having low back pain, however, no radicular pains.      The patient has underwent a non-operative treatment course that has included:  NSAIDs - ibuprofen  Muscle relaxer - Robaxin     Of note he does not use tobacco and does not take blood thinning medications               Past Medical History:   Diagnosis Date    Dyspnea     at rest and with exertion     Hypertension     SOB (shortness of breath)        Past Surgical History:   Procedure Laterality Date    EYE SURGERY Right        Current Outpatient Medications   Medication Sig Dispense Refill    methocarbamol (ROBAXIN) 750 MG tablet Take 1 tablet by mouth 3 times daily      ibuprofen (ADVIL;MOTRIN) 800 MG tablet Take 1 tablet by mouth every 6 hours as needed for Pain      losartan (COZAAR) 50 MG tablet Take 1 tablet by mouth daily

## 2025-07-15 ENCOUNTER — HOSPITAL ENCOUNTER (OUTPATIENT)
Dept: CARDIOLOGY | Facility: HOSPITAL | Age: 65
Discharge: HOME | End: 2025-07-15
Payer: MEDICARE

## 2025-07-15 ENCOUNTER — TELEPHONE (OUTPATIENT)
Dept: CARDIAC SURGERY | Facility: CLINIC | Age: 65
End: 2025-07-15
Payer: MEDICARE

## 2025-07-15 DIAGNOSIS — I25.10 CORONARY ARTERY DISEASE INVOLVING NATIVE CORONARY ARTERY OF NATIVE HEART, UNSPECIFIED WHETHER ANGINA PRESENT: Primary | ICD-10-CM

## 2025-07-15 DIAGNOSIS — I25.10 CORONARY ARTERY DISEASE INVOLVING NATIVE CORONARY ARTERY OF NATIVE HEART, UNSPECIFIED WHETHER ANGINA PRESENT: ICD-10-CM

## 2025-07-15 NOTE — TELEPHONE ENCOUNTER
Attempted to call pt at # given in this message but this # goes to the [place] office.  VM message left with appt info for pt.  Will await records with demographics from their office to call pt/pau

## 2025-07-15 NOTE — TELEPHONE ENCOUNTER
Beverly with Dr. Ledesma's [place] office called this morning. Pt was admitted at [place] and transferred to [facility]. [facility] recommends open heart surgery. Pt wants to see Dr. Romo. They would like to discharge pt from there and have pt see Dr. Romo urgent/STAT. She states pt has had heart cath, coronary CTA, CXR and echo. She is going to fax demographics, referral and reports on testing w/ ATTN Ritika. She states she will have facilities [employer] the imaging. Gail reviewed Dr. Romo's schedule. Next available is 7/22. Informed Beverly this is a possible date for pt. We will look for imaging and transfer it to pt's chart as we receive it. If any questions, pt can be reached at [phone].

## 2025-07-15 NOTE — TELEPHONE ENCOUNTER
Records rec'd and filed in chart.  Called wife and confirmed appt as sched.  She would like to move appt up if anyone cancels/kahm

## 2025-07-15 NOTE — TELEPHONE ENCOUNTER
Is this the provider pushing for earlier visit due to medical necessity or is this patient/family asking for earlier visit?

## 2025-07-21 ENCOUNTER — TELEPHONE (OUTPATIENT)
Dept: NEUROSURGERY | Age: 65
End: 2025-07-21

## 2025-07-21 NOTE — TELEPHONE ENCOUNTER
Appointment canceled per patients mychart request.        Message       ----- Message -----   From: KamleshFabricey   Sent: 7/19/2025   8:26 AM CDT   To: [facility]  Staff   Subject: Appointment not canceled                          Appointment could not be canceled for Norm Whitlock ([id number])   Visit type: FOLLOW UP   7/22/2025 9:15 AM (15 minutes) with SHAMIKA Andrade in [facility] NE[facility]      Reason for cancellation: Other      The above appointment could not be canceled due to technical reasons.Please cancel the appointment and inform the patient.     Appointment not canceled  (Newest Message First)  View All Conversations on this Encounter  Dede Thompson routed conversation to You; Gorge Noe MA; Inez Bond MA1 hour ago (6:46 AM)     oNrm Whitlock  P [facility]  Staff 2 days ago       Appointment could not be canceled for Norm Kamlesh ([id number])  Visit type: FOLLOW UP  7/22/2025 9:15 AM (15 minutes) with SHAMIKA Andrade in [facility] NEUR[facility]ERY     Reason for cancellation: Other     The above appointment could not be canceled due to technical reasons.Please cancel the appointment and inform the patient.

## 2025-07-22 ENCOUNTER — OFFICE VISIT (OUTPATIENT)
Dept: CARDIAC SURGERY | Facility: CLINIC | Age: 65
End: 2025-07-22
Payer: MEDICARE

## 2025-07-22 VITALS
HEIGHT: 71 IN | WEIGHT: 245.4 LBS | BODY MASS INDEX: 34.35 KG/M2 | OXYGEN SATURATION: 98 % | HEART RATE: 71 BPM | SYSTOLIC BLOOD PRESSURE: 144 MMHG | DIASTOLIC BLOOD PRESSURE: 75 MMHG

## 2025-07-22 DIAGNOSIS — I79.8 OTHER DISORDERS OF ARTERIES, ARTERIOLES AND CAPILLARIES IN DISEASES CLASSIFIED ELSEWHERE: ICD-10-CM

## 2025-07-22 DIAGNOSIS — I25.118 CORONARY ARTERY DISEASE OF NATIVE ARTERY OF NATIVE HEART WITH STABLE ANGINA PECTORIS: Primary | ICD-10-CM

## 2025-07-22 DIAGNOSIS — I25.10 CORONARY ARTERY DISEASE INVOLVING NATIVE CORONARY ARTERY OF NATIVE HEART, UNSPECIFIED WHETHER ANGINA PRESENT: Primary | ICD-10-CM

## 2025-07-22 PROCEDURE — 99204 OFFICE O/P NEW MOD 45 MIN: CPT | Performed by: THORACIC SURGERY (CARDIOTHORACIC VASCULAR SURGERY)

## 2025-07-22 PROCEDURE — 1160F RVW MEDS BY RX/DR IN RCRD: CPT | Performed by: THORACIC SURGERY (CARDIOTHORACIC VASCULAR SURGERY)

## 2025-07-22 PROCEDURE — 1159F MED LIST DOCD IN RCRD: CPT | Performed by: THORACIC SURGERY (CARDIOTHORACIC VASCULAR SURGERY)

## 2025-07-22 RX ORDER — PANTOPRAZOLE SODIUM 40 MG/1
40 TABLET, DELAYED RELEASE ORAL
COMMUNITY
Start: 2025-07-15 | End: 2026-01-09

## 2025-07-22 RX ORDER — LOSARTAN POTASSIUM 50 MG/1
50 TABLET ORAL DAILY
COMMUNITY

## 2025-07-22 RX ORDER — ASPIRIN 81 MG/1
81 TABLET, CHEWABLE ORAL DAILY
COMMUNITY
Start: 2025-07-16 | End: 2026-01-09

## 2025-07-22 RX ORDER — L. ACIDOPHILUS/L.BULGARICUS 1MM CELL
2 TABLET ORAL 2 TIMES DAILY
COMMUNITY
Start: 2025-07-15 | End: 2026-01-09

## 2025-07-22 RX ORDER — HYDROXYZINE HYDROCHLORIDE 25 MG/1
1 TABLET, FILM COATED ORAL 3 TIMES DAILY
COMMUNITY
Start: 2025-07-15

## 2025-07-22 RX ORDER — ISOSORBIDE MONONITRATE 30 MG/1
30 TABLET, EXTENDED RELEASE ORAL
COMMUNITY
Start: 2025-07-15 | End: 2026-01-12

## 2025-07-22 RX ORDER — INCLISIRAN 284 MG/1.5ML
284 INJECTION, SOLUTION SUBCUTANEOUS
COMMUNITY

## 2025-07-22 RX ORDER — METHOCARBAMOL 750 MG/1
750 TABLET, FILM COATED ORAL 3 TIMES DAILY
COMMUNITY

## 2025-07-22 RX ORDER — METOPROLOL SUCCINATE 50 MG/1
50 TABLET, EXTENDED RELEASE ORAL DAILY
COMMUNITY

## 2025-07-25 ENCOUNTER — PATIENT ROUNDING (BHMG ONLY) (OUTPATIENT)
Dept: CARDIAC SURGERY | Facility: CLINIC | Age: 65
End: 2025-07-25
Payer: MEDICARE

## 2025-07-25 NOTE — PROGRESS NOTES
A [employer] message has been sent to the patient for PATIENT ROUNDING with [facility].

## 2025-07-27 PROBLEM — I25.118 CORONARY ARTERY DISEASE OF NATIVE ARTERY OF NATIVE HEART WITH STABLE ANGINA PECTORIS: Status: ACTIVE | Noted: 2025-07-27

## 2025-07-27 NOTE — PROGRESS NOTES
Chief Complaint   Patient presents with    Coronary Artery Disease     New patient from Dr Ledesma         Subjective     History of Present Illness  The patient is a 64-year-old male with a past medical history of hypertension, hyperlipidemia, known coronary disease, and arthritis, presenting with abdominal pain, fatigue, and weakness. Workup identified that he was COVID-19 positive and found to have mild colitis at that time. He also had a mildly elevated troponin and underwent a cardiology evaluation with CTA coronary angiography showing a high-risk study and then left heart catheterization identifying multivessel coronary disease. He underwent an evaluation by Dr. Tariq Campbell at [facility] in [place] and was advised to consider coronary artery bypass grafting as he was a reasonable candidate. An echocardiogram was performed showing preserved LV function. He is accompanied by his daughter.    He has a history of hypertension.    He has a history of hyperlipidemia. His cholesterol levels were not adequately controlled with medication, so he switched to a new injection med (not specified by patient), which has been effective in reducing his cholesterol levels.    He has a history of coronary artery disease. He reports no history of stroke or diabetes. He maintains an active lifestyle, working on a farm and running cattle, but does not attend a gym. He consumes a significant amount of sugar daily. He is under the care of Dr. Ledesma, a cardiologist.    He has a history of arthritis.    He was diagnosed with mild colitis during his hospital stay in [place]. He continues to experience some abdominal pain, although it has improved. He is currently taking probiotics and has made dietary changes, including reducing his intake of salt, sugar, and butter, and increasing his fiber intake. He is off antibiotics.    He experiences anxiety and is currently on medication prescribed by his primary care physician, which has  provided some relief.    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  Exercise: Engages in physical activity on the farm, including running cattle.  Diet: Consumes a lot of sugar and dessert-type foods.  Tobacco: Smoked a pack a day from age 14 to 28. Currently does not smoke.    FAMILY HISTORY  - Father: Heart attack at age 42, later developed heart failure.  - Negative for stroke and diabetes.    Review of Systems       Past Medical History:   Diagnosis Date    Anxiety     Arthritis     Coronary artery disease     Diabetes mellitus     Hyperlipidemia     Hypertension     Kidney stone      Past Surgical History:   Procedure Laterality Date    CATARACT EXTRACTION Right 2018     Family History   Problem Relation Age of Onset    Heart attack Father      Social History     Tobacco Use    Smoking status: Former     Current packs/day: 0.00     Average packs/day: 1 pack/day for 21.0 years (21.0 ttl pk-yrs)     Types: Cigarettes     Start date:      Quit date:      Years since quittin.5    Smokeless tobacco: Former     Quit date:    Vaping Use    Vaping status: Never Used   Substance Use Topics    Alcohol use: Not Currently    Drug use: Never     Current Outpatient Medications   Medication Sig Dispense Refill    acidophilus (FLORANEX) tablet tablet Take 2 tablets by mouth 2 (Two) Times a Day.      aspirin 81 MG chewable tablet Chew 1 tablet Daily.      hydrOXYzine (ATARAX) 25 MG tablet Take 1 tablet by mouth 3 times a day.      Inclisiran Sodium (Leqvio) 284 MG/1.5ML solution prefilled syringe Inject 1.5 mL under the skin into the appropriate area as directed.      isosorbide mononitrate (IMDUR) 30 MG 24 hr tablet Take 1 tablet by mouth.      losartan (COZAAR) 50 MG tablet Take 1 tablet by mouth Daily.      methocarbamol (ROBAXIN) 750 MG tablet Take 1 tablet by mouth 3 (Three) Times a Day.      metoprolol succinate XL (TOPROL-XL) 50 MG 24 hr tablet Take 1 tablet by mouth Daily.       "pantoprazole (PROTONIX) 40 MG EC tablet Take 1 tablet by mouth.       No current facility-administered medications for this visit.     Allergies:  Septra [sulfamethoxazole-trimethoprim]    Objective      Vital Signs  Visit Vitals  /75   Pulse 71   Ht 180.3 cm (71\")   Wt 111 kg (245 lb 6.4 oz)   SpO2 98%   BMI 34.23 kg/m²         Physical Exam  Physical Exam  General: Patient appears anxious but in no acute distress.  CV: The heart is regular rate and rhythm without murmurs, rubs, or gallops  Pulmonary: Clear to auscultation bilaterally, no wheezing, rubs, or rales.  Abdomen: Soft, nondistended, nontender.  Obese  Extremities: No swelling noted in legs.  Moving all extremities.  Warm  Psychiatric: Patient appears anxious.    Results Review:   External Echo Procedure  Result Date: 7/15/2025  Narrative: This procedure was auto-finalized with no dictation required.    OUTSIDE INVASIVE CARDIOLOGY STUDY  Result Date: 7/15/2025  Narrative: This procedure was auto-finalized with no dictation required.    IR Vascular Surgery Unlisted Procedure  Result Date: 7/15/2025  Narrative: Images from the original result were not included. [facility] Cardiology CARDIAC CATHETERIZATION NOTE Date of procedure: 7/14/2025 Procedures performed: 1. Selective left and right coronary angiography 2. Left heart catheterization 3. Ultrasound guided vascular access Indication: Abnormal coronary CTA Access site: Right radial artery Catheters: 6F JL 3.5 and 6F JR 4.0 Procedural details: Informed consent was obtained from the patient after explaining risks, benefits and alternatives of the procedure. The patient was brought to the cath lab and prepped and draped in the sterile fashion. Timeout was performed. Lidocaine was used for local anesthesia. Access was obtained in the Right radial artery using micropuncture and modified Seldinger technique and a  6Fr sheath was placed over the wire. Catheter exchanges were made " over a .035 inch guidewire under fluoroscopic guidance. 6F JR 4.0 was used to engage the RCA and multiple cineographic image were taken in orthogonal views. 6F JL 3.5 was used to engage the Left main. Multiple cineographic images were taken in orthogonal views.6F JR 4.0 was used to cross the aortic valve. Filling pressures were recorded and pull back was performed. The catheter was then removed over guidewire. The sheath was then removed and arterial hemostasis was obtained using TR band. There were no obvious complications and the patient was transferred to the ccu/stepdown/recovery unit in hemodynamically stable condition. Findings: Hemodynamics:   Ao 124/84 mmHg,  mmHg /1 mmHg, LVEDP 12 mmHg No hemodynamically significant gradient on pullback across the aortic valve Selective coronary angiography: Dominance: Right dominant Left Main coronary artery: Large-caliber calcified vessel arising from the left coronary cusp, distally has 40 to 50% stenosis.  Trifurcates into LAD, ramus and left circumflex Left anterior descending artery: Large-caliber heavily calcified vessel, gives of small diagonal 1, moderate-sized diagonal 2 which has proximal 60 to 70% stenosis, proximal to mid LAD has 70 to 80% diffuse stenosis, distal LAD has moderate 50 to 60% stenosis, apical LAD has an eccentric 80% stenosis just before wrapping around the apex. Left circumflex: Large-caliber vessel has proximal 60 to 70% stenosis, mid circumflex just after the takeoff of OM1 has 70% stenosis, proximal OM1 has 50% stenosis, mid OM1 at the bifurcation of the superior inferior branch has severe 80 to 90% stenosis.  Distally left circumflex continues in the AV groove and has mild luminal irregularities SARAH-3 flow seen. Ramus intermedius: Small caliber vessel with mild luminal irregularities. Right coronary artery: Moderate caliber dominant vessel, with an anterior takeoff from the right coronary cusp, gives off a conus branch, RV  marginals, distally at the bifurcation to PDA and RPLV has 70 to 80% stenosis, mid RPDA has a 70 to 80% stenosis.  RPDA and RPL V branches are small.  SARAH-3 flow seen. Complications: None Specimen Removed: None Estimated Blood Loss: Minimal Conclusion : Multivessel coronary artery disease Normal left ventricular filling pressures Plan: Cardiac surgery evaluation for CABG Continue aspirin 81 mg and high intensity statin. Optimize medical therapy for CAD and CAD risk factors. Routine post cath care instructions (no driving for 48 hrs, no lifting >10 lbs from right arm for 5 days) were discussed with the patient and family. TR band release per protocol. . This document has been electronically signed by Chase Perera MD on July 15, 2025 8:13 AM   Part of this note may be an electronic transcription/translation of spoken language to printed text using the Dragon Dictation System.     CT Outside Chest  Result Date: 7/15/2025  Narrative: This procedure was auto-finalized with no dictation required.    XR Outside Chest  Result Date: 7/15/2025  Narrative: This procedure was auto-finalized with no dictation required.    CTA CORONARY WITH CALCIUM SCORE (FFR)  Result Date: 7/13/2025  Narrative: CTA CORONARY WITH CALCIUM SCORE (FFR) 7/13/2025 10:46 AM CDT HISTORY: Chest pain COMPARISON: No relevant comparative imaging. TECHNIQUE: Computed tomography of the heart was obtained using prospective EKG triggering initially without the use of contrast media. Multiphasic retrospective EKG-gated volumetric acquisition from the thoracic inlet to the diaphragm during the intravenous administration of contrast, without adverse effect. EKG tube modulation was used to reduce the radiation exposure. Systolic, diastolic and multiphasic and 3D reconstructions were reviewed on a PACS terminal. Dose reduction techniques were utilized for this exam including automated exposure control and/or adjustments to mA and/or kV according to  patient's size and/or the use of iterative reconstruction techniques. This study was reviewed with [employer] analysis.  Study submitted for FFR. FINDINGS: CORONARY CALCIUM: Total coronary artery calcium score = 497. CORONARY CT ANGIOGRAM: Overall quality of the CT angiographic examination is limited somewhat by motion especially on the right Coronary artery system is right dominant with normal origins. Right Coronary Artery: Patent without significant plaque formation a irregularity suspected.  FFR 0.84 distally  Normal PDA Left Mainstem Coronary Artery: Normal origin Left Anterior Descending Coronary Artery: Mixed plaque formation mid to distal LAD.  Functionally significant stenosis suspected.  FFR 0.79, 0.77. Proximal visualized diagonal branches demonstrate a possible first diagonal significant narrowing FFR less than 0.5. Left Circumflex Coronary Artery: Mixed plaque formation.  Functionally significant stenosis suspected with FFR 0.55 Proximally visualized obtuse marginal branches grossly patent LV FUNCTION: End Systolic Volume:47mL's End Diastolic Volume:142mL's Stroke Volume:96mL's Estimated left ventricular ejection fraction is 67%. No regional wall motion abnormalities. EXTRACARDIAC FINDINGS: None significant.    Impression: IMPRESSION: 1. Functionally significant stenosis suspected involving the mid to distal LAD, first diagonal, and circumflex.  Findings reported by FFR. 2. Left ventricular estimated ejection fraction of 67%. 3. Calcium score places the patient in the 60th percentile.    Adult Transthoracic Echo Complete W/ Cont if Necessary Per Protocol  Result Date: 7/13/2025  Narrative:   Technically difficult echocardiogram due to poor windows.  Definity was used.   Left Ventricle: The cavity size is normal.  Wall thickness is normal.   Ventricular mass is normal. Normal systolic function with an estimated ejection fraction of 70 - 75%. Normal wall motion. Normal diastolic function.   Right  Ventricle: Right ventricle cavity size is normal. Normal systolic function. Left Ventricle The cavity size is normal. Wall thickness is normal. Ventricular mass is normal. Normal systolic function with an estimated ejection fraction of 70 - 75%. Normal wall motion. Normal diastolic function. Right Ventricle Right ventricle cavity size is normal. Normal systolic function. Left Atrium Left atrium size is normal. Left atrial volume is normal (<35 mL/m2). Right Atrium Right atrium size is normal. Mitral Valve No regurgitation. Tricuspid Valve Trivial regurgitation. Aortic Valve Aortic valve is trileaflet. Mildly calcified cusps. There is no stenosis. Pulmonic Valve Not well visualized. Pericardium There is no pericardial effusion. Septum Interatrial septum was not well visualized. Aorta and Systemic Arteries Normal sized sinus of Valsalva and ascending thoracic aorta. Systemic Veins IVC diameter is less than or equal to 21 mm and decreases greater than 50% during inspiration; therefore the estimated right atrial pressure is normal (~3 mmHg). Study Quality Verbal consent was obtained. The use of Definity was indicated as two or more segments of the left ventricular endocardial border were unable to be adequately visualized by standard imaging methods.    XR Chest 1 View  Result Date: 7/12/2025  Narrative: XR CHEST 1 VIEW INDICATION: covid. COMPARISON: None relevant. FINDINGS: Heart size, lungs, and pleural spaces are within normal limits.    Results  Labs   - Troponin: Mildly elevated    Imaging   - CTA coronary angiography: High risk study   - Left heart catheterization: Multivessel coronary disease   - Echocardiogram: Preserved LV function  I reviewed the patient's new clinical results.  Discussed with patient and family        Assessment & Plan       Diagnoses and all orders for this visit:    1. Coronary artery disease of native artery of native heart with stable angina pectoris (Primary)  -     Blood Gas, Arterial  -; Future      Assessment & Plan  1. Coronary Artery Disease:  - Coronary artery bypass grafting (CABG) recommended due to presence of three-vessel disease.  No overt chest pain.  He does have fatigue and some mild dyspnea.  Wife reports that he has slowed down over time.  We discussed the options of management of three-vessel coronary artery disease to include medical management only versus PCI versus coronary artery bypass grafting.  We discussed the pros and cons of each option at length.  I discussed the rationale behind the recommendation for he to undergo coronary artery bypass grafting.  We discussed the expected perioperative and postop course of care.  We discussed the risks to include but not limited to bleeding, infection, stroke, heart attack, need for additional procedures, organ dysfunction and/or failure, prolonged ICU stay, prolonged mechanical ventilation, chronic pain syndromes, general anesthesia risks, sternal nonunion, and/or death.  With available data STS risk analysis was used for the basis of risk discussion.  - Schedule CT scan of the chest, ultrasound of the neck vessels, lung function tests, and ultrasound of the legs.  - Contact the office immediately if experiencing changes in condition, such as chest pain or shortness of breath.    2. Colitis:  - Continue taking probiotics, which have helped alleviate symptoms.  - Follow a diet high in fiber while avoiding irritants such as spicy foods and alcohol.  - If his symptoms do not continue to resolve he is instructed to contact his primary care provider or physician that is actively caring for his colitis as an outpatient.    He is a non-smoker.    Many thanks for the opportunity to care for your patient.    I will continue to keep you apprised of provided care as it ensues.        All questions have been answered to the best of my ability with the verbalizing understanding and providing consent for planned coronary artery bypass  grafting      Transcribed from ambient dictation for Hong Romo MD by Hong Romo MD.  07/27/25   12:49 CDT    Patient or patient representative verbalized consent for the use of Ambient List[employer] during the visit with  Hong Romo MD for chart documentation. 7/27/2025  12:58 CDT

## 2025-07-30 ENCOUNTER — TELEPHONE (OUTPATIENT)
Dept: CARDIAC SURGERY | Facility: CLINIC | Age: 65
End: 2025-07-30
Payer: MEDICARE

## 2025-07-30 ENCOUNTER — PREP FOR SURGERY (OUTPATIENT)
Dept: OTHER | Facility: HOSPITAL | Age: 65
End: 2025-07-30
Payer: MEDICARE

## 2025-07-30 DIAGNOSIS — I25.10 CORONARY ARTERY DISEASE INVOLVING NATIVE CORONARY ARTERY OF NATIVE HEART, UNSPECIFIED WHETHER ANGINA PRESENT: Primary | ICD-10-CM

## 2025-07-30 DIAGNOSIS — R06.02 BREATH SHORTNESS: ICD-10-CM

## 2025-07-30 RX ORDER — IBUPROFEN 600 MG/1
1 TABLET ORAL
Status: CANCELLED | OUTPATIENT
Start: 2025-07-30

## 2025-07-30 RX ORDER — CHLORHEXIDINE GLUCONATE 500 MG/1
CLOTH TOPICAL EVERY 12 HOURS PRN
Status: CANCELLED | OUTPATIENT
Start: 2025-07-30

## 2025-07-30 RX ORDER — SODIUM CHLORIDE 9 MG/ML
30 INJECTION, SOLUTION INTRAVENOUS CONTINUOUS PRN
Status: CANCELLED | OUTPATIENT
Start: 2025-07-30 | End: 2025-07-31

## 2025-07-30 RX ORDER — ACETAMINOPHEN 500 MG
1000 TABLET ORAL ONCE
Status: CANCELLED | OUTPATIENT
Start: 2025-08-04

## 2025-07-30 RX ORDER — SODIUM CHLORIDE 0.9 % (FLUSH) 0.9 %
3-10 SYRINGE (ML) INJECTION AS NEEDED
Status: CANCELLED | OUTPATIENT
Start: 2025-07-30

## 2025-07-30 RX ORDER — NICOTINE POLACRILEX 4 MG
15 LOZENGE BUCCAL
Status: CANCELLED | OUTPATIENT
Start: 2025-07-30

## 2025-07-30 RX ORDER — SODIUM CHLORIDE 0.9 % (FLUSH) 0.9 %
3 SYRINGE (ML) INJECTION EVERY 12 HOURS SCHEDULED
Status: CANCELLED | OUTPATIENT
Start: 2025-07-30

## 2025-07-30 RX ORDER — PREGABALIN 25 MG/1
25 CAPSULE ORAL ONCE
Status: CANCELLED | OUTPATIENT
Start: 2025-08-04

## 2025-07-30 RX ORDER — DEXTROSE MONOHYDRATE 25 G/50ML
10-50 INJECTION, SOLUTION INTRAVENOUS
Status: CANCELLED | OUTPATIENT
Start: 2025-07-30

## 2025-07-30 RX ORDER — SODIUM CHLORIDE 0.9 % (FLUSH) 0.9 %
30 SYRINGE (ML) INJECTION ONCE AS NEEDED
Status: CANCELLED | OUTPATIENT
Start: 2025-07-30

## 2025-07-30 NOTE — TELEPHONE ENCOUNTER
Spoke with patient and reviewed preop testing, prework and surgery in detail. He has also requested a [employer] message. I will send this. Patient confirms he uses [facility] in [place]. I will add this to his chart. He is aware Ct will send in prescription for this tomorrow.

## 2025-07-30 NOTE — TELEPHONE ENCOUNTER
Discussed surgery date with Ct. Tentative OR date for patient is 8/4. Patient's testing is currently scheduled for that date, so this will need to be moved up and prework spot saved for Thursday or Friday. I have called and informed patient of this tentative date and that his testing and prework would be moved up to this week. Patient is agreeable with this plan and is aware that I am awaiting further instruction. I spoke with Serene in surgery scheduling and she has a prework spot saved for 0900 on Friday (0815 check-in). I am awaiting a call back from the OP scheduling department to move patient's testing up to this week. He is aware I may not get everything scheduled in 1 day and it will likely be 2 separate days since he will have to be worked in. He is okay with this. He would like a [employer] message with his updated appointment information as well as surgery information once this is scheduled.

## 2025-07-30 NOTE — TELEPHONE ENCOUNTER
Daughter called to verify this surgery date as mother informed her surgery would be Monday.  She was concerned pt and wife were confused b/c pt had not had his testing yet.  Advised her that this is correct and our office is actively working to move up the testing appts to this week.  Informed her this date is tentative based on hosp/emergency situations that may change our schedule.  She voiced understanding to all and just wanted to make sure her parents had understood everything correctly.  She is listed on pt's verbal release./pau

## 2025-07-30 NOTE — TELEPHONE ENCOUNTER
Reviewed, have received more information and due to OR changes, planning to go ahead and perform his surgery on Monday, August 4.  Will place surgery orders.  Let me know which pharmacy he would like Bactroban sent

## 2025-07-31 ENCOUNTER — HOSPITAL ENCOUNTER (OUTPATIENT)
Dept: PULMONOLOGY | Facility: HOSPITAL | Age: 65
Discharge: HOME | End: 2025-07-31
Payer: MEDICARE

## 2025-07-31 ENCOUNTER — HOSPITAL ENCOUNTER (OUTPATIENT)
Dept: CT IMAGING | Facility: HOSPITAL | Age: 65
Discharge: HOME | End: 2025-07-31
Payer: MEDICARE

## 2025-07-31 DIAGNOSIS — I25.10 CORONARY ARTERY DISEASE INVOLVING NATIVE CORONARY ARTERY OF NATIVE HEART, UNSPECIFIED WHETHER ANGINA PRESENT: ICD-10-CM

## 2025-07-31 DIAGNOSIS — I25.118 CORONARY ARTERY DISEASE OF NATIVE ARTERY OF NATIVE HEART WITH STABLE ANGINA PECTORIS: ICD-10-CM

## 2025-07-31 LAB
ARTERIAL PATENCY WRIST A: NORMAL
ATMOSPHERIC PRESS: 753 MMHG
BASE EXCESS BLDA CALC-SCNC: 0.3 MMOL/L (ref 0–2)
BDY SITE: NORMAL
BODY TEMPERATURE: 37
CREAT BLDA-MCNC: 1.5 MG/DL (ref 0.6–1.3)
HCO3 BLDA-SCNC: 24.6 MMOL/L (ref 20–26)
Lab: NORMAL
MODALITY: NORMAL
PCO2 BLDA: 37.8 MM HG (ref 35–45)
PCO2 TEMP ADJ BLD: 37.8 MM HG (ref 35–45)
PH BLDA: 7.42 PH UNITS (ref 7.35–7.45)
PH, TEMP CORRECTED: 7.42 PH UNITS (ref 7.35–7.45)
PO2 BLDA: 92.6 MM HG (ref 83–108)
PO2 TEMP ADJ BLD: 92.6 MM HG (ref 83–108)
SAO2 % BLDCOA: 98.4 % (ref 94–99)
VENTILATOR MODE: NORMAL

## 2025-07-31 PROCEDURE — 82565 ASSAY OF CREATININE: CPT

## 2025-07-31 PROCEDURE — 94010 BREATHING CAPACITY TEST: CPT

## 2025-07-31 PROCEDURE — 36600 WITHDRAWAL OF ARTERIAL BLOOD: CPT

## 2025-07-31 PROCEDURE — 71275 CT ANGIOGRAPHY CHEST: CPT

## 2025-07-31 PROCEDURE — 25510000001 IOPAMIDOL PER 1 ML: Performed by: NURSE PRACTITIONER

## 2025-07-31 PROCEDURE — 82803 BLOOD GASES ANY COMBINATION: CPT

## 2025-07-31 RX ORDER — ALBUTEROL SULFATE 0.83 MG/ML
2.5 SOLUTION RESPIRATORY (INHALATION) ONCE
Status: DISCONTINUED | OUTPATIENT
Start: 2025-07-31 | End: 2025-07-31

## 2025-07-31 RX ORDER — IOPAMIDOL 755 MG/ML
100 INJECTION, SOLUTION INTRAVASCULAR
Status: COMPLETED | OUTPATIENT
Start: 2025-07-31 | End: 2025-07-31

## 2025-07-31 RX ADMIN — IOPAMIDOL 80 ML: 755 INJECTION, SOLUTION INTRAVENOUS at 09:23

## 2025-08-01 ENCOUNTER — PRE-ADMISSION TESTING (OUTPATIENT)
Dept: PREADMISSION TESTING | Facility: HOSPITAL | Age: 65
DRG: 236 | End: 2025-08-01
Payer: MEDICARE

## 2025-08-01 ENCOUNTER — ANESTHESIA EVENT (OUTPATIENT)
Dept: PERIOP | Facility: HOSPITAL | Age: 65
End: 2025-08-01
Payer: MEDICARE

## 2025-08-01 ENCOUNTER — HOSPITAL ENCOUNTER (OUTPATIENT)
Dept: ULTRASOUND IMAGING | Facility: HOSPITAL | Age: 65
Discharge: HOME | End: 2025-08-01
Payer: MEDICARE

## 2025-08-01 ENCOUNTER — HOSPITAL ENCOUNTER (OUTPATIENT)
Dept: GENERAL RADIOLOGY | Facility: HOSPITAL | Age: 65
Discharge: HOME | End: 2025-08-01
Payer: MEDICARE

## 2025-08-01 VITALS
WEIGHT: 249.12 LBS | DIASTOLIC BLOOD PRESSURE: 65 MMHG | RESPIRATION RATE: 16 BRPM | OXYGEN SATURATION: 100 % | BODY MASS INDEX: 35.66 KG/M2 | HEART RATE: 76 BPM | SYSTOLIC BLOOD PRESSURE: 147 MMHG | HEIGHT: 70 IN

## 2025-08-01 DIAGNOSIS — I25.10 CORONARY ARTERY DISEASE INVOLVING NATIVE CORONARY ARTERY OF NATIVE HEART, UNSPECIFIED WHETHER ANGINA PRESENT: ICD-10-CM

## 2025-08-01 DIAGNOSIS — I79.8 OTHER DISORDERS OF ARTERIES, ARTERIOLES AND CAPILLARIES IN DISEASES CLASSIFIED ELSEWHERE: ICD-10-CM

## 2025-08-01 DIAGNOSIS — R06.02 BREATH SHORTNESS: ICD-10-CM

## 2025-08-01 LAB
ABO GROUP BLD: NORMAL
ALBUMIN SERPL-MCNC: 4.2 G/DL (ref 3.5–5.2)
ALBUMIN/GLOB SERPL: 1.3 G/DL
ALP SERPL-CCNC: 76 U/L (ref 39–117)
ALT SERPL W P-5'-P-CCNC: 46 U/L (ref 1–41)
ANION GAP SERPL CALCULATED.3IONS-SCNC: 12 MMOL/L (ref 5–15)
APTT PPP: 28.5 SECONDS (ref 24.5–36)
AST SERPL-CCNC: 61 U/L (ref 1–40)
BASOPHILS # BLD AUTO: 0.06 10*3/MM3 (ref 0–0.2)
BASOPHILS NFR BLD AUTO: 1.1 % (ref 0–1.5)
BILIRUB SERPL-MCNC: 0.2 MG/DL (ref 0–1.2)
BLD GP AB SCN SERPL QL: NEGATIVE
BUN SERPL-MCNC: 22.4 MG/DL (ref 8–23)
BUN/CREAT SERPL: 17.2 (ref 7–25)
CALCIUM SPEC-SCNC: 9.8 MG/DL (ref 8.6–10.5)
CHLORIDE SERPL-SCNC: 101 MMOL/L (ref 98–107)
CO2 SERPL-SCNC: 24 MMOL/L (ref 22–29)
CREAT SERPL-MCNC: 1.3 MG/DL (ref 0.76–1.27)
DEPRECATED RDW RBC AUTO: 41.7 FL (ref 37–54)
EGFRCR SERPLBLD CKD-EPI 2021: 60.6 ML/MIN/1.73
EOSINOPHIL # BLD AUTO: 0.28 10*3/MM3 (ref 0–0.4)
EOSINOPHIL NFR BLD AUTO: 5.3 % (ref 0.3–6.2)
ERYTHROCYTE [DISTWIDTH] IN BLOOD BY AUTOMATED COUNT: 12.8 % (ref 12.3–15.4)
GLOBULIN UR ELPH-MCNC: 3.3 GM/DL
GLUCOSE SERPL-MCNC: 145 MG/DL (ref 65–99)
HCT VFR BLD AUTO: 45.9 % (ref 37.5–51)
HGB BLD-MCNC: 15.1 G/DL (ref 13–17.7)
IMM GRANULOCYTES # BLD AUTO: 0.03 10*3/MM3 (ref 0–0.05)
IMM GRANULOCYTES NFR BLD AUTO: 0.6 % (ref 0–0.5)
INR PPP: 0.99 (ref 0.91–1.09)
LYMPHOCYTES # BLD AUTO: 1.76 10*3/MM3 (ref 0.7–3.1)
LYMPHOCYTES NFR BLD AUTO: 33.3 % (ref 19.6–45.3)
MCH RBC QN AUTO: 29.2 PG (ref 26.6–33)
MCHC RBC AUTO-ENTMCNC: 32.9 G/DL (ref 31.5–35.7)
MCV RBC AUTO: 88.8 FL (ref 79–97)
MONOCYTES # BLD AUTO: 0.37 10*3/MM3 (ref 0.1–0.9)
MONOCYTES NFR BLD AUTO: 7 % (ref 5–12)
NEUTROPHILS NFR BLD AUTO: 2.78 10*3/MM3 (ref 1.7–7)
NEUTROPHILS NFR BLD AUTO: 52.7 % (ref 42.7–76)
NRBC BLD AUTO-RTO: 0 /100 WBC (ref 0–0.2)
PLATELET # BLD AUTO: 308 10*3/MM3 (ref 140–450)
PMV BLD AUTO: 10.2 FL (ref 6–12)
POTASSIUM SERPL-SCNC: 4.3 MMOL/L (ref 3.5–5.2)
PROT SERPL-MCNC: 7.5 G/DL (ref 6–8.5)
PROTHROMBIN TIME: 13.6 SECONDS (ref 11.8–14.8)
RBC # BLD AUTO: 5.17 10*6/MM3 (ref 4.14–5.8)
RH BLD: POSITIVE
SODIUM SERPL-SCNC: 137 MMOL/L (ref 136–145)
T&S EXPIRATION DATE: NORMAL
WBC NRBC COR # BLD AUTO: 5.28 10*3/MM3 (ref 3.4–10.8)

## 2025-08-01 PROCEDURE — 85025 COMPLETE CBC W/AUTO DIFF WBC: CPT

## 2025-08-01 PROCEDURE — 93010 ELECTROCARDIOGRAM REPORT: CPT | Performed by: INTERNAL MEDICINE

## 2025-08-01 PROCEDURE — 36415 COLL VENOUS BLD VENIPUNCTURE: CPT

## 2025-08-01 PROCEDURE — 80053 COMPREHEN METABOLIC PANEL: CPT

## 2025-08-01 PROCEDURE — 85610 PROTHROMBIN TIME: CPT

## 2025-08-01 PROCEDURE — 86900 BLOOD TYPING SEROLOGIC ABO: CPT

## 2025-08-01 PROCEDURE — 93880 EXTRACRANIAL BILAT STUDY: CPT

## 2025-08-01 PROCEDURE — 86901 BLOOD TYPING SEROLOGIC RH(D): CPT

## 2025-08-01 PROCEDURE — 71046 X-RAY EXAM CHEST 2 VIEWS: CPT

## 2025-08-01 PROCEDURE — 85730 THROMBOPLASTIN TIME PARTIAL: CPT

## 2025-08-01 PROCEDURE — 86850 RBC ANTIBODY SCREEN: CPT

## 2025-08-01 PROCEDURE — 93005 ELECTROCARDIOGRAM TRACING: CPT

## 2025-08-01 NOTE — DISCHARGE INSTRUCTIONS

## 2025-08-02 LAB
QT INTERVAL: 342 MS
QTC INTERVAL: 371 MS

## 2025-08-04 ENCOUNTER — APPOINTMENT (OUTPATIENT)
Dept: GENERAL RADIOLOGY | Facility: HOSPITAL | Age: 65
DRG: 236 | End: 2025-08-04
Payer: MEDICARE

## 2025-08-04 ENCOUNTER — APPOINTMENT (OUTPATIENT)
Dept: CARDIOLOGY | Facility: HOSPITAL | Age: 65
DRG: 236 | End: 2025-08-04
Payer: MEDICARE

## 2025-08-04 ENCOUNTER — ANESTHESIA (OUTPATIENT)
Dept: PERIOP | Facility: HOSPITAL | Age: 65
End: 2025-08-04
Payer: MEDICARE

## 2025-08-04 ENCOUNTER — HOSPITAL ENCOUNTER (INPATIENT)
Facility: HOSPITAL | Age: 65
LOS: 9 days | Discharge: HOME | DRG: 236 | End: 2025-08-13
Attending: THORACIC SURGERY (CARDIOTHORACIC VASCULAR SURGERY) | Admitting: THORACIC SURGERY (CARDIOTHORACIC VASCULAR SURGERY)
Payer: MEDICARE

## 2025-08-04 DIAGNOSIS — I25.118 CORONARY ARTERY DISEASE OF NATIVE ARTERY OF NATIVE HEART WITH STABLE ANGINA PECTORIS: ICD-10-CM

## 2025-08-04 DIAGNOSIS — N18.31 STAGE 3A CHRONIC KIDNEY DISEASE: ICD-10-CM

## 2025-08-04 DIAGNOSIS — I25.10 CORONARY ARTERY DISEASE INVOLVING NATIVE CORONARY ARTERY OF NATIVE HEART, UNSPECIFIED WHETHER ANGINA PRESENT: ICD-10-CM

## 2025-08-04 DIAGNOSIS — Z74.09 IMPAIRED MOBILITY: Primary | ICD-10-CM

## 2025-08-04 LAB
A-A DO2: ABNORMAL
ALBUMIN SERPL-MCNC: 3.5 G/DL (ref 3.5–5.2)
ALBUMIN SERPL-MCNC: 3.6 G/DL (ref 3.5–5.2)
ANION GAP SERPL CALCULATED.3IONS-SCNC: 11 MMOL/L (ref 5–15)
ANION GAP SERPL CALCULATED.3IONS-SCNC: 12 MMOL/L (ref 5–15)
APTT PPP: 49.9 SECONDS (ref 24.5–36)
APTT PPP: >200 SECONDS (ref 24.5–36)
ARTERIAL PATENCY WRIST A: ABNORMAL
ATMOSPHERIC PRESS: 752 MMHG
ATMOSPHERIC PRESS: 753 MMHG
ATMOSPHERIC PRESS: 754 MMHG
BASE EXCESS BLDA CALC-SCNC: -1 MMOL/L (ref 0–2)
BASE EXCESS BLDA CALC-SCNC: -1.8 MMOL/L (ref 0–2)
BASE EXCESS BLDA CALC-SCNC: -11.2 MMOL/L (ref 0–2)
BASE EXCESS BLDA CALC-SCNC: -2.4 MMOL/L (ref 0–2)
BASE EXCESS BLDA CALC-SCNC: -2.6 MMOL/L (ref 0–2)
BASE EXCESS BLDA CALC-SCNC: -2.6 MMOL/L (ref 0–2)
BASE EXCESS BLDA CALC-SCNC: -3.3 MMOL/L (ref 0–2)
BASE EXCESS BLDA CALC-SCNC: -4 MMOL/L (ref 0–2)
BASE EXCESS BLDA CALC-SCNC: -4.5 MMOL/L (ref 0–2)
BASE EXCESS BLDA CALC-SCNC: -4.5 MMOL/L (ref 0–2)
BASE EXCESS BLDA CALC-SCNC: -5.2 MMOL/L (ref 0–2)
BDY SITE: ABNORMAL
BODY TEMPERATURE: 37
BUN SERPL-MCNC: 15.1 MG/DL (ref 8–23)
BUN SERPL-MCNC: 15.1 MG/DL (ref 8–23)
BUN/CREAT SERPL: 10.2 (ref 7–25)
BUN/CREAT SERPL: 10.9 (ref 7–25)
CA-I BLD-MCNC: 2.34 MG/DL (ref 4.6–5.4)
CA-I BLD-MCNC: 4.03 MG/DL (ref 4.6–5.4)
CA-I BLD-MCNC: 4.2 MG/DL (ref 4.6–5.4)
CA-I BLD-MCNC: 4.28 MG/DL (ref 4.6–5.4)
CA-I BLD-MCNC: 4.33 MG/DL (ref 4.6–5.4)
CA-I BLD-MCNC: 4.33 MG/DL (ref 4.6–5.4)
CA-I BLD-MCNC: 4.47 MG/DL (ref 4.6–5.4)
CA-I BLD-MCNC: 4.67 MG/DL (ref 4.6–5.4)
CA-I BLD-MCNC: 5.62 MG/DL (ref 4.6–5.4)
CALCIUM SPEC-SCNC: 8.7 MG/DL (ref 8.6–10.5)
CALCIUM SPEC-SCNC: 8.9 MG/DL (ref 8.6–10.5)
CHLORIDE SERPL-SCNC: 106 MMOL/L (ref 98–107)
CHLORIDE SERPL-SCNC: 109 MMOL/L (ref 98–107)
CO2 SERPL-SCNC: 22 MMOL/L (ref 22–29)
CO2 SERPL-SCNC: 23 MMOL/L (ref 22–29)
COHGB MFR BLD: 0.5 % (ref 0–5)
COHGB MFR BLD: 0.6 % (ref 0–5)
COHGB MFR BLD: 0.7 % (ref 0–5)
COHGB MFR BLD: 0.8 % (ref 0–5)
COHGB MFR BLD: 0.8 % (ref 0–5)
COHGB MFR BLD: 0.9 % (ref 0–5)
COHGB MFR BLD: 0.9 % (ref 0–5)
CPAP: ABNORMAL
CREAT SERPL-MCNC: 1.38 MG/DL (ref 0.76–1.27)
CREAT SERPL-MCNC: 1.48 MG/DL (ref 0.76–1.27)
DEPRECATED RDW RBC AUTO: 42.2 FL (ref 37–54)
DEPRECATED RDW RBC AUTO: 42.3 FL (ref 37–54)
DEPRECATED RDW RBC AUTO: 42.5 FL (ref 37–54)
EGFRCR SERPLBLD CKD-EPI 2021: 51.9 ML/MIN/1.73
EGFRCR SERPLBLD CKD-EPI 2021: 56.4 ML/MIN/1.73
EPAP: ABNORMAL
ERYTHROCYTE [DISTWIDTH] IN BLOOD BY AUTOMATED COUNT: 13 % (ref 12.3–15.4)
ERYTHROCYTE [DISTWIDTH] IN BLOOD BY AUTOMATED COUNT: 13 % (ref 12.3–15.4)
ERYTHROCYTE [DISTWIDTH] IN BLOOD BY AUTOMATED COUNT: 13.1 % (ref 12.3–15.4)
FIBRINOGEN PPP-MCNC: 244 MG/DL (ref 240–460)
GAS FLOW AIRWAY: ABNORMAL L/MIN
GLUCOSE BLDA-MCNC: 100 MG/DL (ref 65–99)
GLUCOSE BLDA-MCNC: 106 MG/DL (ref 65–99)
GLUCOSE BLDA-MCNC: 119 MG/DL (ref 65–99)
GLUCOSE BLDA-MCNC: 164 MG/DL (ref 65–99)
GLUCOSE BLDA-MCNC: 176 MG/DL (ref 65–99)
GLUCOSE BLDA-MCNC: 192 MG/DL (ref 65–99)
GLUCOSE BLDA-MCNC: 195 MG/DL (ref 65–99)
GLUCOSE BLDA-MCNC: 202 MG/DL (ref 65–99)
GLUCOSE BLDA-MCNC: 99 MG/DL (ref 65–99)
GLUCOSE BLDC GLUCOMTR-MCNC: 153 MG/DL (ref 70–130)
GLUCOSE BLDC GLUCOMTR-MCNC: 169 MG/DL (ref 70–130)
GLUCOSE BLDC GLUCOMTR-MCNC: 174 MG/DL (ref 70–130)
GLUCOSE BLDC GLUCOMTR-MCNC: 176 MG/DL (ref 70–130)
GLUCOSE SERPL-MCNC: 116 MG/DL (ref 65–99)
GLUCOSE SERPL-MCNC: 181 MG/DL (ref 65–99)
HCO3 BLDA-SCNC: 15.5 MMOL/L (ref 20–26)
HCO3 BLDA-SCNC: 20.1 MMOL/L (ref 20–26)
HCO3 BLDA-SCNC: 20.9 MMOL/L (ref 20–26)
HCO3 BLDA-SCNC: 22 MMOL/L (ref 20–26)
HCO3 BLDA-SCNC: 22.1 MMOL/L (ref 20–26)
HCO3 BLDA-SCNC: 22.3 MMOL/L (ref 20–26)
HCO3 BLDA-SCNC: 22.5 MMOL/L (ref 20–26)
HCO3 BLDA-SCNC: 22.9 MMOL/L (ref 20–26)
HCO3 BLDA-SCNC: 22.9 MMOL/L (ref 20–26)
HCO3 BLDA-SCNC: 24 MMOL/L (ref 20–26)
HCO3 BLDA-SCNC: 24.7 MMOL/L (ref 20–26)
HCT VFR BLD AUTO: 26.8 % (ref 37.5–51)
HCT VFR BLD AUTO: 27.7 % (ref 37.5–51)
HCT VFR BLD AUTO: 30.3 % (ref 37.5–51)
HCT VFR BLD CALC: 26.4 % (ref 38–51)
HCT VFR BLD CALC: 27.8 % (ref 38–51)
HCT VFR BLD CALC: 29 % (ref 38–51)
HCT VFR BLD CALC: 30.8 % (ref 38–51)
HCT VFR BLD CALC: 31.4 % (ref 38–51)
HCT VFR BLD CALC: 31.6 % (ref 38–51)
HCT VFR BLD CALC: 32.1 % (ref 38–51)
HCT VFR BLD CALC: 33.4 % (ref 38–51)
HCT VFR BLD CALC: 40.5 % (ref 38–51)
HGB BLD-MCNC: 10.3 G/DL (ref 13–17.7)
HGB BLD-MCNC: 9.1 G/DL (ref 13–17.7)
HGB BLD-MCNC: 9.4 G/DL (ref 13–17.7)
HGB BLDA-MCNC: 10.1 G/DL (ref 14–18)
HGB BLDA-MCNC: 10.2 G/DL (ref 14–18)
HGB BLDA-MCNC: 10.3 G/DL (ref 14–18)
HGB BLDA-MCNC: 10.5 G/DL (ref 14–18)
HGB BLDA-MCNC: 10.9 G/DL (ref 14–18)
HGB BLDA-MCNC: 13.2 G/DL (ref 14–18)
HGB BLDA-MCNC: 8.6 G/DL (ref 14–18)
HGB BLDA-MCNC: 9.1 G/DL (ref 14–18)
HGB BLDA-MCNC: 9.5 G/DL (ref 14–18)
INHALED O2 CONCENTRATION: 100 %
INHALED O2 CONCENTRATION: 40 %
INHALED O2 CONCENTRATION: 60 %
INR PPP: 1.39 (ref 0.91–1.09)
INR PPP: 2.18 (ref 0.91–1.09)
IPAP: ABNORMAL
LACTATE BLDA-SCNC: 1 MMOL/L (ref 0.5–2)
LACTATE BLDA-SCNC: 2.5 MMOL/L (ref 0.5–2)
LACTATE BLDA-SCNC: 2.5 MMOL/L (ref 0.5–2)
LACTATE BLDA-SCNC: 2.7 MMOL/L (ref 0.5–2)
LACTATE BLDA-SCNC: 3.5 MMOL/L (ref 0.5–2)
LACTATE BLDA-SCNC: 3.6 MMOL/L (ref 0.5–2)
LACTATE BLDA-SCNC: 4.5 MMOL/L (ref 0.5–2)
LACTATE BLDA-SCNC: 5.3 MMOL/L (ref 0.5–2)
LACTATE BLDA-SCNC: 5.5 MMOL/L (ref 0.5–2)
Lab: ABNORMAL
MCH RBC QN AUTO: 30.1 PG (ref 26.6–33)
MCH RBC QN AUTO: 30.3 PG (ref 26.6–33)
MCH RBC QN AUTO: 30.4 PG (ref 26.6–33)
MCHC RBC AUTO-ENTMCNC: 33.9 G/DL (ref 31.5–35.7)
MCHC RBC AUTO-ENTMCNC: 34 G/DL (ref 31.5–35.7)
MCHC RBC AUTO-ENTMCNC: 34 G/DL (ref 31.5–35.7)
MCV RBC AUTO: 88.6 FL (ref 79–97)
MCV RBC AUTO: 89.4 FL (ref 79–97)
MCV RBC AUTO: 89.6 FL (ref 79–97)
METHGB BLD QL: 0.6 % (ref 0–3)
METHGB BLD QL: 0.6 % (ref 0–3)
METHGB BLD QL: 0.7 % (ref 0–3)
METHGB BLD QL: 1.1 % (ref 0–3)
METHGB BLD QL: 1.1 % (ref 0–3)
METHGB BLD QL: 1.2 % (ref 0–3)
MODALITY: ABNORMAL
NITRIC OXIDE: ABNORMAL
NOTE: ABNORMAL
NOTIFIED BY: ABNORMAL
NOTIFIED WHO: ABNORMAL
OXYHGB MFR BLDV: 97.8 % (ref 94–99)
OXYHGB MFR BLDV: 98.1 % (ref 94–99)
OXYHGB MFR BLDV: 98.1 % (ref 94–99)
OXYHGB MFR BLDV: 98.2 % (ref 94–99)
OXYHGB MFR BLDV: 98.2 % (ref 94–99)
OXYHGB MFR BLDV: 98.3 % (ref 94–99)
OXYHGB MFR BLDV: 98.3 % (ref 94–99)
OXYHGB MFR BLDV: 99 % (ref 94–99)
OXYHGB MFR BLDV: 99 % (ref 94–99)
PAW @ PEAK INSP FLOW SETTING VENT: ABNORMAL CM[H2O]
PCO2 BLDA: 37.2 MM HG (ref 35–45)
PCO2 BLDA: 37.3 MM HG (ref 35–45)
PCO2 BLDA: 37.3 MM HG (ref 35–45)
PCO2 BLDA: 37.6 MM HG (ref 35–45)
PCO2 BLDA: 38.7 MM HG (ref 35–45)
PCO2 BLDA: 41.5 MM HG (ref 35–45)
PCO2 BLDA: 44.6 MM HG (ref 35–45)
PCO2 BLDA: 44.7 MM HG (ref 35–45)
PCO2 BLDA: 44.9 MM HG (ref 35–45)
PCO2 BLDA: 45.6 MM HG (ref 35–45)
PCO2 BLDA: 46.9 MM HG (ref 35–45)
PCO2 TEMP ADJ BLD: 37.2 MM HG (ref 35–45)
PCO2 TEMP ADJ BLD: 37.3 MM HG (ref 35–45)
PCO2 TEMP ADJ BLD: 37.3 MM HG (ref 35–45)
PCO2 TEMP ADJ BLD: 37.6 MM HG (ref 35–45)
PCO2 TEMP ADJ BLD: 38.7 MM HG (ref 35–45)
PCO2 TEMP ADJ BLD: 41.5 MM HG (ref 35–45)
PCO2 TEMP ADJ BLD: 44.6 MM HG (ref 35–45)
PCO2 TEMP ADJ BLD: 44.7 MM HG (ref 35–45)
PCO2 TEMP ADJ BLD: 44.9 MM HG (ref 35–45)
PCO2 TEMP ADJ BLD: 45.6 MM HG (ref 35–45)
PCO2 TEMP ADJ BLD: 46.9 MM HG (ref 35–45)
PEEP RESPIRATORY: 8 CM[H2O]
PEEP RESPIRATORY: 8 CM[H2O]
PEEP RESPIRATORY: ABNORMAL CM[H2O]
PH BLDA: 7.23 PH UNITS (ref 7.35–7.45)
PH BLDA: 7.29 PH UNITS (ref 7.35–7.45)
PH BLDA: 7.29 PH UNITS (ref 7.35–7.45)
PH BLDA: 7.32 PH UNITS (ref 7.35–7.45)
PH BLDA: 7.34 PH UNITS (ref 7.35–7.45)
PH BLDA: 7.35 PH UNITS (ref 7.35–7.45)
PH BLDA: 7.35 PH UNITS (ref 7.35–7.45)
PH BLDA: 7.38 PH UNITS (ref 7.35–7.45)
PH BLDA: 7.38 PH UNITS (ref 7.35–7.45)
PH, TEMP CORRECTED: 7.23 PH UNITS (ref 7.35–7.45)
PH, TEMP CORRECTED: 7.29 PH UNITS (ref 7.35–7.45)
PH, TEMP CORRECTED: 7.29 PH UNITS (ref 7.35–7.45)
PH, TEMP CORRECTED: 7.32 PH UNITS (ref 7.35–7.45)
PH, TEMP CORRECTED: 7.34 PH UNITS (ref 7.35–7.45)
PH, TEMP CORRECTED: 7.35 PH UNITS (ref 7.35–7.45)
PH, TEMP CORRECTED: 7.35 PH UNITS (ref 7.35–7.45)
PH, TEMP CORRECTED: 7.38 PH UNITS (ref 7.35–7.45)
PH, TEMP CORRECTED: 7.38 PH UNITS (ref 7.35–7.45)
PHOSPHATE SERPL-MCNC: 3.1 MG/DL (ref 2.5–4.5)
PHOSPHATE SERPL-MCNC: 3.2 MG/DL (ref 2.5–4.5)
PLATELET # BLD AUTO: 137 10*3/MM3 (ref 140–450)
PLATELET # BLD AUTO: 167 10*3/MM3 (ref 140–450)
PLATELET # BLD AUTO: 202 10*3/MM3 (ref 140–450)
PMV BLD AUTO: 10.2 FL (ref 6–12)
PMV BLD AUTO: 10.8 FL (ref 6–12)
PMV BLD AUTO: 9.8 FL (ref 6–12)
PO2 BLD: 199 MM[HG] (ref 0–500)
PO2 BLD: 243 MM[HG] (ref 0–500)
PO2 BLDA: 146 MM HG (ref 83–108)
PO2 BLDA: 79.6 MM HG (ref 83–108)
PO2 BLDA: ABNORMAL MM[HG]
PO2 TEMP ADJ BLD: 146 MM HG (ref 83–108)
PO2 TEMP ADJ BLD: 216 MM HG (ref 83–108)
PO2 TEMP ADJ BLD: 317 MM HG (ref 83–108)
PO2 TEMP ADJ BLD: 366 MM HG (ref 83–108)
PO2 TEMP ADJ BLD: 395 MM HG (ref 83–108)
PO2 TEMP ADJ BLD: 489 MM HG (ref 83–108)
PO2 TEMP ADJ BLD: 523 MM HG (ref 83–108)
PO2 TEMP ADJ BLD: 529 MM HG (ref 83–108)
PO2 TEMP ADJ BLD: 79.6 MM HG (ref 83–108)
POTASSIUM BLDA-SCNC: 3.1 MMOL/L (ref 3.5–5.2)
POTASSIUM BLDA-SCNC: 3.9 MMOL/L (ref 3.5–5.2)
POTASSIUM BLDA-SCNC: 4 MMOL/L (ref 3.5–5.2)
POTASSIUM BLDA-SCNC: 4.3 MMOL/L (ref 3.5–5.2)
POTASSIUM BLDA-SCNC: 5 MMOL/L (ref 3.5–5.2)
POTASSIUM BLDA-SCNC: 5.5 MMOL/L (ref 3.5–5.2)
POTASSIUM BLDA-SCNC: 6.4 MMOL/L (ref 3.5–5.2)
POTASSIUM BLDA-SCNC: 6.7 MMOL/L (ref 3.5–5.2)
POTASSIUM BLDA-SCNC: 6.9 MMOL/L (ref 3.5–5.2)
POTASSIUM SERPL-SCNC: 4.5 MMOL/L (ref 3.5–5.2)
POTASSIUM SERPL-SCNC: 5.7 MMOL/L (ref 3.5–5.2)
PROTHROMBIN TIME: 17.8 SECONDS (ref 11.8–14.8)
PROTHROMBIN TIME: 25.6 SECONDS (ref 11.8–14.8)
PSV: 10 CMH2O
PSV: ABNORMAL
PULSE OX: ABNORMAL
RBC # BLD AUTO: 2.99 10*6/MM3 (ref 4.14–5.8)
RBC # BLD AUTO: 3.1 10*6/MM3 (ref 4.14–5.8)
RBC # BLD AUTO: 3.42 10*6/MM3 (ref 4.14–5.8)
SAO2 % BLDCOA: 100 % (ref 94–99)
SAO2 % BLDCOA: 96.5 % (ref 94–99)
SAO2 % BLDCOA: 99.6 % (ref 94–99)
SAO2 % BLDCOA: 99.6 % (ref 94–99)
SAO2 % BLDCOA: 99.7 % (ref 94–99)
SAO2 % BLDCOA: >100.1 % (ref 94–99)
SET MECH RESP RATE: 18
SET MECH RESP RATE: 18
SET MECH RESP RATE: ABNORMAL
SODIUM BLDA-SCNC: 137 MMOL/L (ref 136–145)
SODIUM BLDA-SCNC: 138 MMOL/L (ref 136–145)
SODIUM BLDA-SCNC: 139 MMOL/L (ref 136–145)
SODIUM BLDA-SCNC: 143 MMOL/L (ref 136–145)
SODIUM BLDA-SCNC: 143 MMOL/L (ref 136–145)
SODIUM BLDA-SCNC: 144 MMOL/L (ref 136–145)
SODIUM BLDA-SCNC: 146 MMOL/L (ref 136–145)
SODIUM BLDA-SCNC: 147 MMOL/L (ref 136–145)
SODIUM BLDA-SCNC: 154 MMOL/L (ref 136–145)
SODIUM SERPL-SCNC: 140 MMOL/L (ref 136–145)
SODIUM SERPL-SCNC: 143 MMOL/L (ref 136–145)
TOTAL RATE: ABNORMAL
VENTILATOR MODE: ABNORMAL
VENTILATOR MODE: AC
VT ON VENT VENT: 500 ML
VT ON VENT VENT: 500 ML
VT ON VENT VENT: ABNORMAL ML
WBC NRBC COR # BLD AUTO: 10.4 10*3/MM3 (ref 3.4–10.8)
WBC NRBC COR # BLD AUTO: 11.16 10*3/MM3 (ref 3.4–10.8)
WBC NRBC COR # BLD AUTO: 16.61 10*3/MM3 (ref 3.4–10.8)

## 2025-08-04 PROCEDURE — 25010000002 NITROGLYCERIN 200 MCG/ML SOLUTION: Performed by: NURSE ANESTHETIST, CERTIFIED REGISTERED

## 2025-08-04 PROCEDURE — C1751 CATH, INF, PER/CENT/MIDLINE: HCPCS | Performed by: NURSE ANESTHETIST, CERTIFIED REGISTERED

## 2025-08-04 PROCEDURE — 83605 ASSAY OF LACTIC ACID: CPT

## 2025-08-04 PROCEDURE — 25010000002 LIDOCAINE PF 2% 2 % SOLUTION: Performed by: NURSE ANESTHETIST, CERTIFIED REGISTERED

## 2025-08-04 PROCEDURE — 25010000002 PAPAVERINE PER 60 MG: Performed by: THORACIC SURGERY (CARDIOTHORACIC VASCULAR SURGERY)

## 2025-08-04 PROCEDURE — 25010000002 PHENYLEPHRINE 10 MG/ML SOLUTION: Performed by: THORACIC SURGERY (CARDIOTHORACIC VASCULAR SURGERY)

## 2025-08-04 PROCEDURE — 94761 N-INVAS EAR/PLS OXIMETRY MLT: CPT

## 2025-08-04 PROCEDURE — 94640 AIRWAY INHALATION TREATMENT: CPT

## 2025-08-04 PROCEDURE — 93010 ELECTROCARDIOGRAM REPORT: CPT | Performed by: INTERNAL MEDICINE

## 2025-08-04 PROCEDURE — 25010000002 HEPARIN (PORCINE) PER 1000 UNITS: Performed by: NURSE ANESTHETIST, CERTIFIED REGISTERED

## 2025-08-04 PROCEDURE — 25010000002 PROPOFOL 10 MG/ML EMULSION: Performed by: NURSE ANESTHETIST, CERTIFIED REGISTERED

## 2025-08-04 PROCEDURE — 25010000002 VANCOMYCIN 1 G RECONSTITUTED SOLUTION 1 EACH VIAL: Performed by: THORACIC SURGERY (CARDIOTHORACIC VASCULAR SURGERY)

## 2025-08-04 PROCEDURE — 82947 ASSAY GLUCOSE BLOOD QUANT: CPT

## 2025-08-04 PROCEDURE — 02100Z9 BYPASS CORONARY ARTERY, ONE ARTERY FROM LEFT INTERNAL MAMMARY, OPEN APPROACH: ICD-10-PCS | Performed by: THORACIC SURGERY (CARDIOTHORACIC VASCULAR SURGERY)

## 2025-08-04 PROCEDURE — 25010000002 CLEVIDIPINE BUTYRATE PER 1 MG

## 2025-08-04 PROCEDURE — 25010000002 CEFAZOLIN PER 500 MG: Performed by: THORACIC SURGERY (CARDIOTHORACIC VASCULAR SURGERY)

## 2025-08-04 PROCEDURE — 021209W BYPASS CORONARY ARTERY, THREE ARTERIES FROM AORTA WITH AUTOLOGOUS VENOUS TISSUE, OPEN APPROACH: ICD-10-PCS | Performed by: THORACIC SURGERY (CARDIOTHORACIC VASCULAR SURGERY)

## 2025-08-04 PROCEDURE — C1751 CATH, INF, PER/CENT/MIDLINE: HCPCS | Performed by: THORACIC SURGERY (CARDIOTHORACIC VASCULAR SURGERY)

## 2025-08-04 PROCEDURE — P9100 PATHOGEN TEST FOR PLATELETS: HCPCS

## 2025-08-04 PROCEDURE — 93005 ELECTROCARDIOGRAM TRACING: CPT | Performed by: THORACIC SURGERY (CARDIOTHORACIC VASCULAR SURGERY)

## 2025-08-04 PROCEDURE — 94002 VENT MGMT INPAT INIT DAY: CPT

## 2025-08-04 PROCEDURE — 86927 PLASMA FRESH FROZEN: CPT

## 2025-08-04 PROCEDURE — 85730 THROMBOPLASTIN TIME PARTIAL: CPT | Performed by: THORACIC SURGERY (CARDIOTHORACIC VASCULAR SURGERY)

## 2025-08-04 PROCEDURE — 86920 COMPATIBILITY TEST SPIN: CPT

## 2025-08-04 PROCEDURE — C1713 ANCHOR/SCREW BN/BN,TIS/BN: HCPCS | Performed by: THORACIC SURGERY (CARDIOTHORACIC VASCULAR SURGERY)

## 2025-08-04 PROCEDURE — 82330 ASSAY OF CALCIUM: CPT

## 2025-08-04 PROCEDURE — 25810000003 SODIUM CHLORIDE 0.9 % SOLUTION 250 ML FLEX CONT: Performed by: NURSE ANESTHETIST, CERTIFIED REGISTERED

## 2025-08-04 PROCEDURE — 25010000002 FENTANYL CITRATE (PF) 50 MCG/ML SOLUTION: Performed by: THORACIC SURGERY (CARDIOTHORACIC VASCULAR SURGERY)

## 2025-08-04 PROCEDURE — 25010000002 MIDAZOLAM PER 1MG: Performed by: ANESTHESIOLOGY

## 2025-08-04 PROCEDURE — A4648 IMPLANTABLE TISSUE MARKER: HCPCS | Performed by: THORACIC SURGERY (CARDIOTHORACIC VASCULAR SURGERY)

## 2025-08-04 PROCEDURE — 97607 NEG PRS WND THR NDME<=50SQCM: CPT | Performed by: THORACIC SURGERY (CARDIOTHORACIC VASCULAR SURGERY)

## 2025-08-04 PROCEDURE — 84132 ASSAY OF SERUM POTASSIUM: CPT

## 2025-08-04 PROCEDURE — 25010000002 NITROGLYCERIN 200 MCG/ML SOLUTION: Performed by: THORACIC SURGERY (CARDIOTHORACIC VASCULAR SURGERY)

## 2025-08-04 PROCEDURE — 25010000002 MIDAZOLAM HCL (PF) 5 MG/5ML SOLUTION: Performed by: NURSE ANESTHETIST, CERTIFIED REGISTERED

## 2025-08-04 PROCEDURE — 85384 FIBRINOGEN ACTIVITY: CPT | Performed by: THORACIC SURGERY (CARDIOTHORACIC VASCULAR SURGERY)

## 2025-08-04 PROCEDURE — B24BZZ4 ULTRASONOGRAPHY OF HEART WITH AORTA, TRANSESOPHAGEAL: ICD-10-PCS | Performed by: ANESTHESIOLOGY

## 2025-08-04 PROCEDURE — 33508 ENDOSCOPIC VEIN HARVEST: CPT | Performed by: THORACIC SURGERY (CARDIOTHORACIC VASCULAR SURGERY)

## 2025-08-04 PROCEDURE — 94799 UNLISTED PULMONARY SVC/PX: CPT

## 2025-08-04 PROCEDURE — P9059 PLASMA, FRZ BETWEEN 8-24HOUR: HCPCS

## 2025-08-04 PROCEDURE — 86901 BLOOD TYPING SEROLOGIC RH(D): CPT

## 2025-08-04 PROCEDURE — 25810000003 SODIUM CHLORIDE 0.9 % SOLUTION: Performed by: THORACIC SURGERY (CARDIOTHORACIC VASCULAR SURGERY)

## 2025-08-04 PROCEDURE — 25810000003 DEXTROSE 5 % WITH KCL 20 MEQ 20 MEQ/L SOLUTION: Performed by: THORACIC SURGERY (CARDIOTHORACIC VASCULAR SURGERY)

## 2025-08-04 PROCEDURE — 85027 COMPLETE CBC AUTOMATED: CPT | Performed by: THORACIC SURGERY (CARDIOTHORACIC VASCULAR SURGERY)

## 2025-08-04 PROCEDURE — 25810000003 SODIUM CHLORIDE 0.9 % SOLUTION 250 ML FLEX CONT: Performed by: THORACIC SURGERY (CARDIOTHORACIC VASCULAR SURGERY)

## 2025-08-04 PROCEDURE — 93318 ECHO TRANSESOPHAGEAL INTRAOP: CPT | Performed by: NURSE ANESTHETIST, CERTIFIED REGISTERED

## 2025-08-04 PROCEDURE — 80069 RENAL FUNCTION PANEL: CPT | Performed by: THORACIC SURGERY (CARDIOTHORACIC VASCULAR SURGERY)

## 2025-08-04 PROCEDURE — 0PH000Z INSERTION OF RIGID PLATE INTERNAL FIXATION DEVICE INTO STERNUM, OPEN APPROACH: ICD-10-PCS | Performed by: THORACIC SURGERY (CARDIOTHORACIC VASCULAR SURGERY)

## 2025-08-04 PROCEDURE — 33519 CABG ARTERY-VEIN THREE: CPT | Performed by: THORACIC SURGERY (CARDIOTHORACIC VASCULAR SURGERY)

## 2025-08-04 PROCEDURE — 25010000002 ACETAMINOPHEN 10 MG/ML SOLUTION: Performed by: THORACIC SURGERY (CARDIOTHORACIC VASCULAR SURGERY)

## 2025-08-04 PROCEDURE — 36430 TRANSFUSION BLD/BLD COMPNT: CPT

## 2025-08-04 PROCEDURE — 25010000002 METOPROLOL TARTRATE 5 MG/5ML SOLUTION: Performed by: NURSE ANESTHETIST, CERTIFIED REGISTERED

## 2025-08-04 PROCEDURE — P9035 PLATELET PHERES LEUKOREDUCED: HCPCS

## 2025-08-04 PROCEDURE — 83050 HGB METHEMOGLOBIN QUAN: CPT

## 2025-08-04 PROCEDURE — 82803 BLOOD GASES ANY COMBINATION: CPT

## 2025-08-04 PROCEDURE — C9248 INJ, CLEVIDIPINE BUTYRATE: HCPCS

## 2025-08-04 PROCEDURE — 25010000002 FENTANYL CITRATE (PF) 50 MCG/ML SOLUTION: Performed by: NURSE ANESTHETIST, CERTIFIED REGISTERED

## 2025-08-04 PROCEDURE — 71045 X-RAY EXAM CHEST 1 VIEW: CPT

## 2025-08-04 PROCEDURE — 25810000003 LACTATED RINGERS PER 1000 ML: Performed by: THORACIC SURGERY (CARDIOTHORACIC VASCULAR SURGERY)

## 2025-08-04 PROCEDURE — 04HY32Z INSERTION OF MONITORING DEVICE INTO LOWER ARTERY, PERCUTANEOUS APPROACH: ICD-10-PCS | Performed by: THORACIC SURGERY (CARDIOTHORACIC VASCULAR SURGERY)

## 2025-08-04 PROCEDURE — 82948 REAGENT STRIP/BLOOD GLUCOSE: CPT

## 2025-08-04 PROCEDURE — 25010000002 METHYLENE BLUE 50 MG/10ML SOLUTION: Performed by: THORACIC SURGERY (CARDIOTHORACIC VASCULAR SURGERY)

## 2025-08-04 PROCEDURE — 33533 CABG ARTERIAL SINGLE: CPT | Performed by: THORACIC SURGERY (CARDIOTHORACIC VASCULAR SURGERY)

## 2025-08-04 PROCEDURE — 25010000002 AMINOCAPROIC ACID PER 5 G: Performed by: NURSE ANESTHETIST, CERTIFIED REGISTERED

## 2025-08-04 PROCEDURE — 25010000002 FENTANYL CITRATE (PF) 100 MCG/2ML SOLUTION: Performed by: NURSE ANESTHETIST, CERTIFIED REGISTERED

## 2025-08-04 PROCEDURE — 25010000002 CLEVIDIPINE BUTYRATE PER 1 MG: Performed by: THORACIC SURGERY (CARDIOTHORACIC VASCULAR SURGERY)

## 2025-08-04 PROCEDURE — 5A1221Z PERFORMANCE OF CARDIAC OUTPUT, CONTINUOUS: ICD-10-PCS | Performed by: THORACIC SURGERY (CARDIOTHORACIC VASCULAR SURGERY)

## 2025-08-04 PROCEDURE — 85610 PROTHROMBIN TIME: CPT | Performed by: THORACIC SURGERY (CARDIOTHORACIC VASCULAR SURGERY)

## 2025-08-04 PROCEDURE — 85018 HEMOGLOBIN: CPT

## 2025-08-04 PROCEDURE — 25010000002 AMIODARONE IN DEXTROSE 5% 360-4.14 MG/200ML-% SOLUTION: Performed by: THORACIC SURGERY (CARDIOTHORACIC VASCULAR SURGERY)

## 2025-08-04 PROCEDURE — C9248 INJ, CLEVIDIPINE BUTYRATE: HCPCS | Performed by: THORACIC SURGERY (CARDIOTHORACIC VASCULAR SURGERY)

## 2025-08-04 PROCEDURE — 86900 BLOOD TYPING SEROLOGIC ABO: CPT

## 2025-08-04 PROCEDURE — 06BP4ZZ EXCISION OF RIGHT SAPHENOUS VEIN, PERCUTANEOUS ENDOSCOPIC APPROACH: ICD-10-PCS | Performed by: THORACIC SURGERY (CARDIOTHORACIC VASCULAR SURGERY)

## 2025-08-04 PROCEDURE — 25010000002 CEFAZOLIN PER 500 MG: Performed by: NURSE PRACTITIONER

## 2025-08-04 PROCEDURE — 84295 ASSAY OF SERUM SODIUM: CPT

## 2025-08-04 PROCEDURE — 25010000002 HEPARIN (PORCINE) PER 1000 UNITS: Performed by: THORACIC SURGERY (CARDIOTHORACIC VASCULAR SURGERY)

## 2025-08-04 DEVICE — ABSORBABLE HEMOSTAT (OXIDIZED REGENERATED CELLULOSE)
Type: IMPLANTABLE DEVICE | Site: CHEST | Status: FUNCTIONAL
Brand: SURGICEL

## 2025-08-04 DEVICE — KT PLT STERNALOCK/XP AXILR SHRP: Type: IMPLANTABLE DEVICE | Site: CHEST | Status: FUNCTIONAL

## 2025-08-04 DEVICE — CLIP LIGAT VASC HORIZON TI LG ORNG 6CT: Type: IMPLANTABLE DEVICE | Site: CHEST | Status: FUNCTIONAL

## 2025-08-04 DEVICE — PLEDGET INCISIONLINE REINF TFE SFT PTFE 1.5X3X7MM WHT: Type: IMPLANTABLE DEVICE | Site: HEART | Status: FUNCTIONAL

## 2025-08-04 DEVICE — SCRW BONE THORECON 2.7X10MM PK/8: Type: IMPLANTABLE DEVICE | Site: CHEST | Status: FUNCTIONAL

## 2025-08-04 DEVICE — HEMOST ABS SURGICEL SNOW 4X4IN: Type: IMPLANTABLE DEVICE | Site: CHEST | Status: FUNCTIONAL

## 2025-08-04 DEVICE — WR SUT NONABS MF SS V40 1/2CIR TC 6/0 18IN M649G: Type: IMPLANTABLE DEVICE | Site: CHEST | Status: FUNCTIONAL

## 2025-08-04 DEVICE — KT STERNALOCK XP X/PLATE: Type: IMPLANTABLE DEVICE | Site: CHEST | Status: FUNCTIONAL

## 2025-08-04 DEVICE — HEMOST ABS SURGIFOAM SZ100 8X12 10MM: Type: IMPLANTABLE DEVICE | Site: CHEST | Status: FUNCTIONAL

## 2025-08-04 DEVICE — KT HEMOST ABS SURGIFOAM PORCN 1GRAM: Type: IMPLANTABLE DEVICE | Site: CHEST | Status: FUNCTIONAL

## 2025-08-04 DEVICE — LIGACLIP EXTRA LIGATING CLIP CARTRIDGES: 6 TITANIUM CLIPS/ CARTRIDGE (SMALL)
Type: IMPLANTABLE DEVICE | Site: HEART | Status: FUNCTIONAL
Brand: LIGACLIP

## 2025-08-04 DEVICE — SEAL HEMO SURG ARISTA/AH ABS/PWDR 3GM: Type: IMPLANTABLE DEVICE | Site: CHEST | Status: FUNCTIONAL

## 2025-08-04 DEVICE — DISK-SHAPED STYLE, SILICONE (1 PER STERILE PKG)
Type: IMPLANTABLE DEVICE | Site: HEART | Status: FUNCTIONAL
Brand: SCANLAN® RADIOMARK® GRAFT MARKERS

## 2025-08-04 RX ORDER — NICOTINE POLACRILEX 4 MG
15 LOZENGE BUCCAL
Status: DISCONTINUED | OUTPATIENT
Start: 2025-08-04 | End: 2025-08-05

## 2025-08-04 RX ORDER — VANCOMYCIN 1.75 GRAM/500 ML IN 0.9 % SODIUM CHLORIDE INTRAVENOUS
AS NEEDED
Status: DISCONTINUED | OUTPATIENT
Start: 2025-08-04 | End: 2025-08-04 | Stop reason: SURG

## 2025-08-04 RX ORDER — PREGABALIN 25 MG/1
25 CAPSULE ORAL ONCE
Status: COMPLETED | OUTPATIENT
Start: 2025-08-04 | End: 2025-08-04

## 2025-08-04 RX ORDER — NALOXONE HCL 0.4 MG/ML
0.4 VIAL (ML) INJECTION
Status: ACTIVE | OUTPATIENT
Start: 2025-08-04 | End: 2025-08-06

## 2025-08-04 RX ORDER — ACETAMINOPHEN 650 MG/1
650 SUPPOSITORY RECTAL EVERY 6 HOURS
Status: DISCONTINUED | OUTPATIENT
Start: 2025-08-05 | End: 2025-08-13 | Stop reason: HOSPADM

## 2025-08-04 RX ORDER — FENTANYL CITRATE 50 UG/ML
50 INJECTION, SOLUTION INTRAMUSCULAR; INTRAVENOUS
Status: DISCONTINUED | OUTPATIENT
Start: 2025-08-04 | End: 2025-08-06

## 2025-08-04 RX ORDER — SODIUM CHLORIDE, SODIUM LACTATE, POTASSIUM CHLORIDE, CALCIUM CHLORIDE 600; 310; 30; 20 MG/100ML; MG/100ML; MG/100ML; MG/100ML
100 INJECTION, SOLUTION INTRAVENOUS CONTINUOUS
Status: DISCONTINUED | OUTPATIENT
Start: 2025-08-04 | End: 2025-08-04

## 2025-08-04 RX ORDER — MIDAZOLAM HYDROCHLORIDE 2 MG/2ML
2 INJECTION, SOLUTION INTRAMUSCULAR; INTRAVENOUS ONCE
Status: COMPLETED | OUTPATIENT
Start: 2025-08-04 | End: 2025-08-04

## 2025-08-04 RX ORDER — PROPOFOL 10 MG/ML
VIAL (ML) INTRAVENOUS AS NEEDED
Status: DISCONTINUED | OUTPATIENT
Start: 2025-08-04 | End: 2025-08-04 | Stop reason: SURG

## 2025-08-04 RX ORDER — POTASSIUM CHLORIDE, DEXTROSE MONOHYDRATE 150; 5 MG/100ML; G/100ML
30 INJECTION, SOLUTION INTRAVENOUS CONTINUOUS
Status: DISCONTINUED | OUTPATIENT
Start: 2025-08-04 | End: 2025-08-06

## 2025-08-04 RX ORDER — CHLORHEXIDINE GLUCONATE 500 MG/1
1 CLOTH TOPICAL ONCE
Status: DISCONTINUED | OUTPATIENT
Start: 2025-08-04 | End: 2025-08-06

## 2025-08-04 RX ORDER — BISACODYL 5 MG/1
10 TABLET, DELAYED RELEASE ORAL 2 TIMES DAILY
Status: DISCONTINUED | OUTPATIENT
Start: 2025-08-05 | End: 2025-08-13 | Stop reason: HOSPADM

## 2025-08-04 RX ORDER — METOPROLOL TARTRATE 1 MG/ML
INJECTION, SOLUTION INTRAVENOUS AS NEEDED
Status: DISCONTINUED | OUTPATIENT
Start: 2025-08-04 | End: 2025-08-04 | Stop reason: SURG

## 2025-08-04 RX ORDER — LIDOCAINE HYDROCHLORIDE 20 MG/ML
INJECTION, SOLUTION EPIDURAL; INFILTRATION; INTRACAUDAL; PERINEURAL AS NEEDED
Status: DISCONTINUED | OUTPATIENT
Start: 2025-08-04 | End: 2025-08-04 | Stop reason: SURG

## 2025-08-04 RX ORDER — ROCURONIUM BROMIDE 10 MG/ML
INJECTION, SOLUTION INTRAVENOUS AS NEEDED
Status: DISCONTINUED | OUTPATIENT
Start: 2025-08-04 | End: 2025-08-04 | Stop reason: SURG

## 2025-08-04 RX ORDER — MEPERIDINE HYDROCHLORIDE 50 MG/ML
25 INJECTION INTRAMUSCULAR; INTRAVENOUS; SUBCUTANEOUS
Status: ACTIVE | OUTPATIENT
Start: 2025-08-04 | End: 2025-08-05

## 2025-08-04 RX ORDER — ENOXAPARIN SODIUM 100 MG/ML
40 INJECTION SUBCUTANEOUS DAILY
Status: DISCONTINUED | OUTPATIENT
Start: 2025-08-05 | End: 2025-08-13 | Stop reason: HOSPADM

## 2025-08-04 RX ORDER — CLOPIDOGREL BISULFATE 75 MG/1
75 TABLET ORAL DAILY
Status: DISCONTINUED | OUTPATIENT
Start: 2025-08-05 | End: 2025-08-13 | Stop reason: HOSPADM

## 2025-08-04 RX ORDER — ACETAMINOPHEN 500 MG
1000 TABLET ORAL ONCE
Status: COMPLETED | OUTPATIENT
Start: 2025-08-04 | End: 2025-08-04

## 2025-08-04 RX ORDER — SODIUM CHLORIDE 0.9 % (FLUSH) 0.9 %
3 SYRINGE (ML) INJECTION EVERY 12 HOURS SCHEDULED
Status: DISCONTINUED | OUTPATIENT
Start: 2025-08-04 | End: 2025-08-04 | Stop reason: HOSPADM

## 2025-08-04 RX ORDER — SODIUM CHLORIDE, SODIUM LACTATE, POTASSIUM CHLORIDE, CALCIUM CHLORIDE 600; 310; 30; 20 MG/100ML; MG/100ML; MG/100ML; MG/100ML
1000 INJECTION, SOLUTION INTRAVENOUS CONTINUOUS
Status: DISCONTINUED | OUTPATIENT
Start: 2025-08-04 | End: 2025-08-04

## 2025-08-04 RX ORDER — DEXMEDETOMIDINE HYDROCHLORIDE 4 UG/ML
INJECTION, SOLUTION INTRAVENOUS CONTINUOUS PRN
Status: DISCONTINUED | OUTPATIENT
Start: 2025-08-04 | End: 2025-08-04 | Stop reason: SURG

## 2025-08-04 RX ORDER — NICOTINE POLACRILEX 4 MG
15 LOZENGE BUCCAL
Status: DISCONTINUED | OUTPATIENT
Start: 2025-08-04 | End: 2025-08-04 | Stop reason: HOSPADM

## 2025-08-04 RX ORDER — METOPROLOL TARTRATE 25 MG/1
12.5 TABLET, FILM COATED ORAL EVERY 12 HOURS SCHEDULED
Status: DISCONTINUED | OUTPATIENT
Start: 2025-08-05 | End: 2025-08-05

## 2025-08-04 RX ORDER — ACETAMINOPHEN 10 MG/ML
1000 INJECTION, SOLUTION INTRAVENOUS EVERY 8 HOURS
Status: COMPLETED | OUTPATIENT
Start: 2025-08-04 | End: 2025-08-05

## 2025-08-04 RX ORDER — ACETAMINOPHEN 500 MG
1000 TABLET ORAL EVERY 6 HOURS
Status: DISCONTINUED | OUTPATIENT
Start: 2025-08-05 | End: 2025-08-13 | Stop reason: HOSPADM

## 2025-08-04 RX ORDER — CHLORHEXIDINE GLUCONATE 500 MG/1
CLOTH TOPICAL EVERY 12 HOURS PRN
Status: DISCONTINUED | OUTPATIENT
Start: 2025-08-04 | End: 2025-08-04 | Stop reason: HOSPADM

## 2025-08-04 RX ORDER — OXYCODONE HYDROCHLORIDE 5 MG/1
5 TABLET ORAL
Status: DISCONTINUED | OUTPATIENT
Start: 2025-08-04 | End: 2025-08-07

## 2025-08-04 RX ORDER — HEPARIN SODIUM 1000 [USP'U]/ML
INJECTION, SOLUTION INTRAVENOUS; SUBCUTANEOUS AS NEEDED
Status: DISCONTINUED | OUTPATIENT
Start: 2025-08-04 | End: 2025-08-04 | Stop reason: SURG

## 2025-08-04 RX ORDER — ACETAMINOPHEN 160 MG/5ML
1000 SOLUTION ORAL EVERY 6 HOURS
Status: DISCONTINUED | OUTPATIENT
Start: 2025-08-05 | End: 2025-08-13 | Stop reason: HOSPADM

## 2025-08-04 RX ORDER — LIDOCAINE HYDROCHLORIDE 10 MG/ML
0.5 INJECTION, SOLUTION EPIDURAL; INFILTRATION; INTRACAUDAL; PERINEURAL ONCE AS NEEDED
Status: DISCONTINUED | OUTPATIENT
Start: 2025-08-04 | End: 2025-08-04 | Stop reason: HOSPADM

## 2025-08-04 RX ORDER — SODIUM CHLORIDE 9 MG/ML
40 INJECTION, SOLUTION INTRAVENOUS AS NEEDED
Status: DISCONTINUED | OUTPATIENT
Start: 2025-08-04 | End: 2025-08-04 | Stop reason: HOSPADM

## 2025-08-04 RX ORDER — ASPIRIN 81 MG/1
81 TABLET ORAL DAILY
Status: DISCONTINUED | OUTPATIENT
Start: 2025-08-06 | End: 2025-08-13 | Stop reason: HOSPADM

## 2025-08-04 RX ORDER — CHLORHEXIDINE GLUCONATE 500 MG/1
1 CLOTH TOPICAL EVERY 24 HOURS
Status: DISCONTINUED | OUTPATIENT
Start: 2025-08-05 | End: 2025-08-07

## 2025-08-04 RX ORDER — PHENYLEPHRINE HCL IN 0.9% NACL 50MG/250ML
.5-3 PLASTIC BAG, INJECTION (ML) INTRAVENOUS CONTINUOUS PRN
Status: DISCONTINUED | OUTPATIENT
Start: 2025-08-04 | End: 2025-08-07

## 2025-08-04 RX ORDER — ALPRAZOLAM 1 MG/1
1 TABLET ORAL 3 TIMES DAILY PRN
COMMUNITY

## 2025-08-04 RX ORDER — FENTANYL CITRATE 50 UG/ML
INJECTION, SOLUTION INTRAMUSCULAR; INTRAVENOUS AS NEEDED
Status: DISCONTINUED | OUTPATIENT
Start: 2025-08-04 | End: 2025-08-04 | Stop reason: SURG

## 2025-08-04 RX ORDER — ALBUTEROL SULFATE 0.83 MG/ML
2.5 SOLUTION RESPIRATORY (INHALATION) EVERY 4 HOURS PRN
Status: ACTIVE | OUTPATIENT
Start: 2025-08-04 | End: 2025-08-05

## 2025-08-04 RX ORDER — OXYCODONE HYDROCHLORIDE 10 MG/1
10 TABLET ORAL
Status: DISCONTINUED | OUTPATIENT
Start: 2025-08-04 | End: 2025-08-07

## 2025-08-04 RX ORDER — PANTOPRAZOLE SODIUM 40 MG/1
40 TABLET, DELAYED RELEASE ORAL EVERY MORNING
Status: COMPLETED | OUTPATIENT
Start: 2025-08-05 | End: 2025-08-07

## 2025-08-04 RX ORDER — NITROGLYCERIN 20 MG/100ML
5 INJECTION INTRAVENOUS CONTINUOUS
Status: DISCONTINUED | OUTPATIENT
Start: 2025-08-04 | End: 2025-08-06

## 2025-08-04 RX ORDER — SODIUM CHLORIDE 0.9 % (FLUSH) 0.9 %
10 SYRINGE (ML) INJECTION AS NEEDED
Status: DISCONTINUED | OUTPATIENT
Start: 2025-08-04 | End: 2025-08-04 | Stop reason: HOSPADM

## 2025-08-04 RX ORDER — DEXMEDETOMIDINE HYDROCHLORIDE 4 UG/ML
.2-1.5 INJECTION, SOLUTION INTRAVENOUS
Status: DISCONTINUED | OUTPATIENT
Start: 2025-08-04 | End: 2025-08-05

## 2025-08-04 RX ORDER — AMIODARONE HYDROCHLORIDE 200 MG/1
400 TABLET ORAL 2 TIMES DAILY WITH MEALS
Status: DISCONTINUED | OUTPATIENT
Start: 2025-08-05 | End: 2025-08-08

## 2025-08-04 RX ORDER — SODIUM CHLORIDE 0.9 % (FLUSH) 0.9 %
3 SYRINGE (ML) INJECTION AS NEEDED
Status: DISCONTINUED | OUTPATIENT
Start: 2025-08-04 | End: 2025-08-04 | Stop reason: HOSPADM

## 2025-08-04 RX ORDER — ATORVASTATIN CALCIUM 10 MG/1
20 TABLET, FILM COATED ORAL NIGHTLY
Status: DISCONTINUED | OUTPATIENT
Start: 2025-08-05 | End: 2025-08-13 | Stop reason: HOSPADM

## 2025-08-04 RX ORDER — MIDAZOLAM HYDROCHLORIDE 5 MG/5ML
INJECTION, SOLUTION INTRAMUSCULAR; INTRAVENOUS AS NEEDED
Status: DISCONTINUED | OUTPATIENT
Start: 2025-08-04 | End: 2025-08-04 | Stop reason: SURG

## 2025-08-04 RX ORDER — LIDOCAINE 4 G/G
2 PATCH TOPICAL EVERY 24 HOURS
Status: DISCONTINUED | OUTPATIENT
Start: 2025-08-04 | End: 2025-08-13 | Stop reason: HOSPADM

## 2025-08-04 RX ORDER — SODIUM CHLORIDE 0.9 % (FLUSH) 0.9 %
3-10 SYRINGE (ML) INJECTION AS NEEDED
Status: DISCONTINUED | OUTPATIENT
Start: 2025-08-04 | End: 2025-08-04 | Stop reason: HOSPADM

## 2025-08-04 RX ORDER — PREGABALIN 25 MG/1
25 CAPSULE ORAL EVERY 12 HOURS
Status: DISCONTINUED | OUTPATIENT
Start: 2025-08-05 | End: 2025-08-06

## 2025-08-04 RX ORDER — SODIUM CHLORIDE 0.9 % (FLUSH) 0.9 %
30 SYRINGE (ML) INJECTION ONCE AS NEEDED
Status: DISCONTINUED | OUTPATIENT
Start: 2025-08-04 | End: 2025-08-04 | Stop reason: HOSPADM

## 2025-08-04 RX ORDER — FENTANYL CITRATE 50 UG/ML
25 INJECTION, SOLUTION INTRAMUSCULAR; INTRAVENOUS
Status: DISCONTINUED | OUTPATIENT
Start: 2025-08-04 | End: 2025-08-06

## 2025-08-04 RX ORDER — ASPIRIN 81 MG/1
162 TABLET, CHEWABLE ORAL ONCE
Status: COMPLETED | OUTPATIENT
Start: 2025-08-05 | End: 2025-08-05

## 2025-08-04 RX ORDER — ONDANSETRON 2 MG/ML
4 INJECTION INTRAMUSCULAR; INTRAVENOUS EVERY 6 HOURS PRN
Status: DISCONTINUED | OUTPATIENT
Start: 2025-08-04 | End: 2025-08-13 | Stop reason: HOSPADM

## 2025-08-04 RX ORDER — IBUPROFEN 600 MG/1
1 TABLET ORAL
Status: DISCONTINUED | OUTPATIENT
Start: 2025-08-04 | End: 2025-08-05

## 2025-08-04 RX ORDER — IPRATROPIUM BROMIDE AND ALBUTEROL SULFATE 2.5; .5 MG/3ML; MG/3ML
1.5 SOLUTION RESPIRATORY (INHALATION)
Status: DISCONTINUED | OUTPATIENT
Start: 2025-08-04 | End: 2025-08-06

## 2025-08-04 RX ORDER — IBUPROFEN 600 MG/1
1 TABLET ORAL
Status: DISCONTINUED | OUTPATIENT
Start: 2025-08-04 | End: 2025-08-04 | Stop reason: HOSPADM

## 2025-08-04 RX ORDER — SODIUM CHLORIDE 9 MG/ML
30 INJECTION, SOLUTION INTRAVENOUS CONTINUOUS PRN
Status: DISCONTINUED | OUTPATIENT
Start: 2025-08-04 | End: 2025-08-04 | Stop reason: HOSPADM

## 2025-08-04 RX ORDER — DEXTROSE MONOHYDRATE 25 G/50ML
10-50 INJECTION, SOLUTION INTRAVENOUS
Status: DISCONTINUED | OUTPATIENT
Start: 2025-08-04 | End: 2025-08-05

## 2025-08-04 RX ORDER — PANTOPRAZOLE SODIUM 40 MG/10ML
40 INJECTION, POWDER, LYOPHILIZED, FOR SOLUTION INTRAVENOUS ONCE
Status: COMPLETED | OUTPATIENT
Start: 2025-08-04 | End: 2025-08-04

## 2025-08-04 RX ORDER — DEXTROSE MONOHYDRATE 25 G/50ML
10-50 INJECTION, SOLUTION INTRAVENOUS
Status: DISCONTINUED | OUTPATIENT
Start: 2025-08-04 | End: 2025-08-04 | Stop reason: HOSPADM

## 2025-08-04 RX ORDER — POLYETHYLENE GLYCOL 3350 17 G/17G
17 POWDER, FOR SOLUTION ORAL DAILY
Status: DISCONTINUED | OUTPATIENT
Start: 2025-08-05 | End: 2025-08-13 | Stop reason: HOSPADM

## 2025-08-04 RX ORDER — MIDAZOLAM HYDROCHLORIDE 2 MG/2ML
0.5 INJECTION, SOLUTION INTRAMUSCULAR; INTRAVENOUS
Status: DISCONTINUED | OUTPATIENT
Start: 2025-08-04 | End: 2025-08-04 | Stop reason: HOSPADM

## 2025-08-04 RX ORDER — MAGNESIUM HYDROXIDE 1200 MG/15ML
LIQUID ORAL AS NEEDED
Status: DISCONTINUED | OUTPATIENT
Start: 2025-08-04 | End: 2025-08-04 | Stop reason: HOSPADM

## 2025-08-04 RX ADMIN — NITROGLYCERIN 5 MCG/MIN: 20 INJECTION INTRAVENOUS at 16:43

## 2025-08-04 RX ADMIN — FENTANYL CITRATE 100 MCG: 50 INJECTION, SOLUTION INTRAMUSCULAR; INTRAVENOUS at 14:55

## 2025-08-04 RX ADMIN — POTASSIUM CHLORIDE, DEXTROSE MONOHYDRATE 30 ML/HR: 150; 5 INJECTION, SOLUTION INTRAVENOUS at 15:41

## 2025-08-04 RX ADMIN — INSULIN HUMAN 2.3 UNITS/HR: 1 INJECTION, SOLUTION INTRAVENOUS at 18:00

## 2025-08-04 RX ADMIN — LIDOCAINE 2 PATCH: 4 PATCH TOPICAL at 16:46

## 2025-08-04 RX ADMIN — HEPARIN SODIUM 42000 UNITS: 1000 INJECTION, SOLUTION INTRAVENOUS; SUBCUTANEOUS at 09:15

## 2025-08-04 RX ADMIN — ROCURONIUM BROMIDE 50 MG: 10 INJECTION, SOLUTION INTRAVENOUS at 11:43

## 2025-08-04 RX ADMIN — ROCURONIUM BROMIDE 50 MG: 10 INJECTION, SOLUTION INTRAVENOUS at 13:37

## 2025-08-04 RX ADMIN — ROCURONIUM BROMIDE 50 MG: 10 INJECTION, SOLUTION INTRAVENOUS at 09:58

## 2025-08-04 RX ADMIN — FENTANYL CITRATE 400 MCG: 50 INJECTION, SOLUTION INTRAMUSCULAR; INTRAVENOUS at 07:22

## 2025-08-04 RX ADMIN — AMIODARONE HYDROCHLORIDE 1 MG/MIN: 1.8 INJECTION, SOLUTION INTRAVENOUS at 22:20

## 2025-08-04 RX ADMIN — INSULIN HUMAN 3 UNITS/HR: 1 INJECTION, SOLUTION INTRAVENOUS at 11:18

## 2025-08-04 RX ADMIN — ACETAMINOPHEN 1000 MG: 1000 INJECTION, SOLUTION INTRAVENOUS at 16:53

## 2025-08-04 RX ADMIN — ROCURONIUM BROMIDE 50 MG: 10 INJECTION, SOLUTION INTRAVENOUS at 11:00

## 2025-08-04 RX ADMIN — Medication 1 APPLICATION: at 06:11

## 2025-08-04 RX ADMIN — SODIUM CHLORIDE, POTASSIUM CHLORIDE, SODIUM LACTATE AND CALCIUM CHLORIDE 1000 ML: 600; 310; 30; 20 INJECTION, SOLUTION INTRAVENOUS at 06:12

## 2025-08-04 RX ADMIN — LIDOCAINE HYDROCHLORIDE 100 MG: 20 INJECTION, SOLUTION EPIDURAL; INFILTRATION; INTRACAUDAL; PERINEURAL at 07:22

## 2025-08-04 RX ADMIN — MIDAZOLAM HYDROCHLORIDE 3 MG: 1 INJECTION, SOLUTION INTRAMUSCULAR; INTRAVENOUS at 07:18

## 2025-08-04 RX ADMIN — HEPARIN SODIUM 5000 UNITS: 1000 INJECTION, SOLUTION INTRAVENOUS; SUBCUTANEOUS at 09:29

## 2025-08-04 RX ADMIN — CEFAZOLIN 2000 MG: 2 INJECTION, POWDER, FOR SOLUTION INTRAMUSCULAR; INTRAVENOUS at 08:01

## 2025-08-04 RX ADMIN — PREGABALIN 25 MG: 25 CAPSULE ORAL at 06:10

## 2025-08-04 RX ADMIN — FENTANYL CITRATE 125 MCG: 50 INJECTION, SOLUTION INTRAMUSCULAR; INTRAVENOUS at 10:54

## 2025-08-04 RX ADMIN — IPRATROPIUM BROMIDE AND ALBUTEROL SULFATE 1.5 ML: .5; 3 SOLUTION RESPIRATORY (INHALATION) at 15:42

## 2025-08-04 RX ADMIN — DEXMEDETOMIDINE HYDROCHLORIDE 0.2 MCG/KG/HR: 4 INJECTION, SOLUTION INTRAVENOUS at 12:06

## 2025-08-04 RX ADMIN — FENTANYL CITRATE 100 MCG: 50 INJECTION, SOLUTION INTRAMUSCULAR; INTRAVENOUS at 07:18

## 2025-08-04 RX ADMIN — SODIUM CHLORIDE 40 MCG: 9 INJECTION, SOLUTION INTRAMUSCULAR; INTRAVENOUS; SUBCUTANEOUS at 13:55

## 2025-08-04 RX ADMIN — PROPOFOL 80 MG: 10 INJECTION, EMULSION INTRAVENOUS at 07:22

## 2025-08-04 RX ADMIN — IPRATROPIUM BROMIDE AND ALBUTEROL SULFATE 1.5 ML: .5; 3 SOLUTION RESPIRATORY (INHALATION) at 18:51

## 2025-08-04 RX ADMIN — METOPROLOL TARTRATE 2.5 MG: 5 INJECTION INTRAVENOUS at 07:57

## 2025-08-04 RX ADMIN — DEXMEDETOMIDINE HYDROCHLORIDE 1 MCG/KG/HR: 4 INJECTION, SOLUTION INTRAVENOUS at 16:42

## 2025-08-04 RX ADMIN — PHENYLEPHRINE HYDROCHLORIDE 0.5 MCG/KG/MIN: 10 INJECTION INTRAVENOUS at 22:27

## 2025-08-04 RX ADMIN — CLEVIPIDINE 2 MG/HR: 0.5 EMULSION INTRAVENOUS at 15:20

## 2025-08-04 RX ADMIN — FENTANYL CITRATE 250 MCG: 50 INJECTION, SOLUTION INTRAMUSCULAR; INTRAVENOUS at 08:48

## 2025-08-04 RX ADMIN — FENTANYL CITRATE 100 MCG: 50 INJECTION, SOLUTION INTRAMUSCULAR; INTRAVENOUS at 14:24

## 2025-08-04 RX ADMIN — MIDAZOLAM HYDROCHLORIDE 2 MG: 1 INJECTION, SOLUTION INTRAMUSCULAR; INTRAVENOUS at 10:14

## 2025-08-04 RX ADMIN — FENTANYL CITRATE 75 MCG: 50 INJECTION, SOLUTION INTRAMUSCULAR; INTRAVENOUS at 11:06

## 2025-08-04 RX ADMIN — PANTOPRAZOLE SODIUM 40 MG: 40 INJECTION, POWDER, FOR SOLUTION INTRAVENOUS at 16:47

## 2025-08-04 RX ADMIN — VANCOMYCIN HYDROCHLORIDE 1000 MG: 1 INJECTION, POWDER, LYOPHILIZED, FOR SOLUTION INTRAVENOUS at 18:07

## 2025-08-04 RX ADMIN — ROCURONIUM BROMIDE 50 MG: 10 INJECTION, SOLUTION INTRAVENOUS at 08:46

## 2025-08-04 RX ADMIN — ACETAMINOPHEN 1000 MG: 500 TABLET, FILM COATED ORAL at 06:10

## 2025-08-04 RX ADMIN — FENTANYL CITRATE 50 MCG: 50 INJECTION, SOLUTION INTRAMUSCULAR; INTRAVENOUS at 18:23

## 2025-08-04 RX ADMIN — AMINOCAPROIC ACID 5 G: 250 INJECTION, SOLUTION INTRAVENOUS at 07:43

## 2025-08-04 RX ADMIN — NOREPINEPHRINE BITARTRATE 0.01 MCG/KG/MIN: 1 INJECTION, SOLUTION, CONCENTRATE INTRAVENOUS at 09:45

## 2025-08-04 RX ADMIN — CEFAZOLIN 2 G: 2 INJECTION, POWDER, FOR SOLUTION INTRAMUSCULAR; INTRAVENOUS at 20:10

## 2025-08-04 RX ADMIN — ROCURONIUM BROMIDE 100 MG: 10 INJECTION, SOLUTION INTRAVENOUS at 07:22

## 2025-08-04 RX ADMIN — CEFAZOLIN 2000 MG: 2 INJECTION, POWDER, FOR SOLUTION INTRAMUSCULAR; INTRAVENOUS at 12:06

## 2025-08-04 RX ADMIN — MIDAZOLAM HYDROCHLORIDE 2 MG: 1 INJECTION, SOLUTION INTRAMUSCULAR; INTRAVENOUS at 06:27

## 2025-08-04 RX ADMIN — HEPARIN SODIUM 3000 UNITS: 1000 INJECTION, SOLUTION INTRAVENOUS; SUBCUTANEOUS at 08:20

## 2025-08-04 RX ADMIN — Medication 1 APPLICATION: at 18:08

## 2025-08-04 RX ADMIN — FENTANYL CITRATE 25 MCG: 50 INJECTION, SOLUTION INTRAMUSCULAR; INTRAVENOUS at 22:17

## 2025-08-04 RX ADMIN — FENTANYL CITRATE 50 MCG: 50 INJECTION, SOLUTION INTRAMUSCULAR; INTRAVENOUS at 11:18

## 2025-08-04 RX ADMIN — Medication 1.75 G: at 07:41

## 2025-08-04 RX ADMIN — POTASSIUM CHLORIDE, DEXTROSE MONOHYDRATE 30 ML/HR: 150; 5 INJECTION, SOLUTION INTRAVENOUS at 15:43

## 2025-08-04 RX ADMIN — CLEVIPIDINE 2 MG/HR: 0.5 EMULSION INTRAVENOUS at 15:15

## 2025-08-05 ENCOUNTER — APPOINTMENT (OUTPATIENT)
Dept: GENERAL RADIOLOGY | Facility: HOSPITAL | Age: 65
DRG: 236 | End: 2025-08-05
Payer: MEDICARE

## 2025-08-05 LAB
ALBUMIN SERPL-MCNC: 3.2 G/DL (ref 3.5–5.2)
ALBUMIN SERPL-MCNC: 3.6 G/DL (ref 3.5–5.2)
ALP SERPL-CCNC: 46 U/L (ref 39–117)
ALT SERPL W P-5'-P-CCNC: 48 U/L (ref 1–41)
ANION GAP SERPL CALCULATED.3IONS-SCNC: 12 MMOL/L (ref 5–15)
ANISOCYTOSIS BLD QL: ABNORMAL
ARTERIAL PATENCY WRIST A: ABNORMAL
AST SERPL-CCNC: 286 U/L (ref 1–40)
ATMOSPHERIC PRESS: 754 MMHG
BASE EXCESS BLDA CALC-SCNC: -1.8 MMOL/L (ref 0–2)
BASOPHILS # BLD MANUAL: 0 10*3/MM3 (ref 0–0.2)
BASOPHILS NFR BLD MANUAL: 0 % (ref 0–1.5)
BDY SITE: ABNORMAL
BH BB BLOOD EXPIRATION DATE: NORMAL
BH BB BLOOD TYPE BARCODE: 6200
BH BB DISPENSE STATUS: NORMAL
BH BB PRODUCT CODE: NORMAL
BH BB UNIT NUMBER: NORMAL
BILIRUB CONJ SERPL-MCNC: 0.1 MG/DL (ref 0–0.3)
BILIRUB INDIRECT SERPL-MCNC: 0.1 MG/DL
BILIRUB SERPL-MCNC: 0.2 MG/DL (ref 0–1.2)
BODY TEMPERATURE: 37
BUN SERPL-MCNC: 18.1 MG/DL (ref 8–23)
BUN/CREAT SERPL: 11.3 (ref 7–25)
CALCIUM SPEC-SCNC: 8.5 MG/DL (ref 8.6–10.5)
CHLORIDE SERPL-SCNC: 105 MMOL/L (ref 98–107)
CO2 SERPL-SCNC: 21 MMOL/L (ref 22–29)
CREAT SERPL-MCNC: 1.6 MG/DL (ref 0.76–1.27)
CROSSMATCH INTERPRETATION: NORMAL
CROSSMATCH INTERPRETATION: NORMAL
DEPRECATED RDW RBC AUTO: 43.5 FL (ref 37–54)
EGFRCR SERPLBLD CKD-EPI 2021: 47.2 ML/MIN/1.73
EOSINOPHIL # BLD MANUAL: 0 10*3/MM3 (ref 0–0.4)
EOSINOPHIL NFR BLD MANUAL: 0 % (ref 0.3–6.2)
ERYTHROCYTE [DISTWIDTH] IN BLOOD BY AUTOMATED COUNT: 13.2 % (ref 12.3–15.4)
GLUCOSE BLDC GLUCOMTR-MCNC: 148 MG/DL (ref 70–130)
GLUCOSE BLDC GLUCOMTR-MCNC: 152 MG/DL (ref 70–130)
GLUCOSE BLDC GLUCOMTR-MCNC: 158 MG/DL (ref 70–130)
GLUCOSE BLDC GLUCOMTR-MCNC: 169 MG/DL (ref 70–130)
GLUCOSE BLDC GLUCOMTR-MCNC: 182 MG/DL (ref 70–130)
GLUCOSE BLDC GLUCOMTR-MCNC: 191 MG/DL (ref 70–130)
GLUCOSE SERPL-MCNC: 150 MG/DL (ref 65–99)
HCO3 BLDA-SCNC: 23.3 MMOL/L (ref 20–26)
HCT VFR BLD AUTO: 29.2 % (ref 37.5–51)
HGB BLD-MCNC: 9.7 G/DL (ref 13–17.7)
INHALED O2 CONCENTRATION: 40 %
INR PPP: 1.19 (ref 0.91–1.09)
LYMPHOCYTES # BLD MANUAL: 0.67 10*3/MM3 (ref 0.7–3.1)
LYMPHOCYTES NFR BLD MANUAL: 4 % (ref 5–12)
Lab: ABNORMAL
MAGNESIUM SERPL-MCNC: 1.9 MG/DL (ref 1.6–2.4)
MCH RBC QN AUTO: 29.6 PG (ref 26.6–33)
MCHC RBC AUTO-ENTMCNC: 33.2 G/DL (ref 31.5–35.7)
MCV RBC AUTO: 89 FL (ref 79–97)
MICROCYTES BLD QL: ABNORMAL
MODALITY: ABNORMAL
MONOCYTES # BLD: 0.53 10*3/MM3 (ref 0.1–0.9)
NEUTROPHILS # BLD AUTO: 12.13 10*3/MM3 (ref 1.7–7)
NEUTROPHILS NFR BLD MANUAL: 81 % (ref 42.7–76)
NEUTS BAND NFR BLD MANUAL: 10 % (ref 0–5)
PCO2 BLDA: 40.1 MM HG (ref 35–45)
PCO2 TEMP ADJ BLD: 40.1 MM HG (ref 35–45)
PEEP RESPIRATORY: 8 CM[H2O]
PH BLDA: 7.37 PH UNITS (ref 7.35–7.45)
PH, TEMP CORRECTED: 7.37 PH UNITS (ref 7.35–7.45)
PHOSPHATE SERPL-MCNC: 3.6 MG/DL (ref 2.5–4.5)
PLAT MORPH BLD: NORMAL
PLATELET # BLD AUTO: 243 10*3/MM3 (ref 140–450)
PMV BLD AUTO: 10.4 FL (ref 6–12)
PO2 BLD: 280 MM[HG] (ref 0–500)
PO2 BLDA: 112 MM HG (ref 83–108)
PO2 TEMP ADJ BLD: 112 MM HG (ref 83–108)
POTASSIUM SERPL-SCNC: 5.2 MMOL/L (ref 3.5–5.2)
PROT SERPL-MCNC: 5.3 G/DL (ref 6–8.5)
PROTHROMBIN TIME: 15.7 SECONDS (ref 11.8–14.8)
PSV: 10 CMH2O
RBC # BLD AUTO: 3.28 10*6/MM3 (ref 4.14–5.8)
SAO2 % BLDCOA: 99.1 % (ref 94–99)
SODIUM SERPL-SCNC: 138 MMOL/L (ref 136–145)
UNIT  ABO: NORMAL
UNIT  RH: NORMAL
VARIANT LYMPHS NFR BLD MANUAL: 5 % (ref 19.6–45.3)
VENTILATOR MODE: ABNORMAL
WBC MORPH BLD: NORMAL
WBC NRBC COR # BLD AUTO: 13.33 10*3/MM3 (ref 3.4–10.8)

## 2025-08-05 PROCEDURE — 82248 BILIRUBIN DIRECT: CPT | Performed by: THORACIC SURGERY (CARDIOTHORACIC VASCULAR SURGERY)

## 2025-08-05 PROCEDURE — 97530 THERAPEUTIC ACTIVITIES: CPT

## 2025-08-05 PROCEDURE — 25010000002 ACETAMINOPHEN 10 MG/ML SOLUTION: Performed by: THORACIC SURGERY (CARDIOTHORACIC VASCULAR SURGERY)

## 2025-08-05 PROCEDURE — 99024 POSTOP FOLLOW-UP VISIT: CPT | Performed by: THORACIC SURGERY (CARDIOTHORACIC VASCULAR SURGERY)

## 2025-08-05 PROCEDURE — 25010000002 PHENYLEPHRINE 10 MG/ML SOLUTION: Performed by: THORACIC SURGERY (CARDIOTHORACIC VASCULAR SURGERY)

## 2025-08-05 PROCEDURE — 85025 COMPLETE CBC W/AUTO DIFF WBC: CPT | Performed by: THORACIC SURGERY (CARDIOTHORACIC VASCULAR SURGERY)

## 2025-08-05 PROCEDURE — 25810000003 SODIUM CHLORIDE 0.9 % SOLUTION: Performed by: THORACIC SURGERY (CARDIOTHORACIC VASCULAR SURGERY)

## 2025-08-05 PROCEDURE — 63710000001 INSULIN LISPRO (HUMAN) PER 5 UNITS: Performed by: THORACIC SURGERY (CARDIOTHORACIC VASCULAR SURGERY)

## 2025-08-05 PROCEDURE — 71045 X-RAY EXAM CHEST 1 VIEW: CPT

## 2025-08-05 PROCEDURE — 25010000002 MAGNESIUM SULFATE 4 GM/100ML SOLUTION: Performed by: THORACIC SURGERY (CARDIOTHORACIC VASCULAR SURGERY)

## 2025-08-05 PROCEDURE — 94664 DEMO&/EVAL PT USE INHALER: CPT

## 2025-08-05 PROCEDURE — 25010000002 CEFAZOLIN PER 500 MG: Performed by: THORACIC SURGERY (CARDIOTHORACIC VASCULAR SURGERY)

## 2025-08-05 PROCEDURE — 93005 ELECTROCARDIOGRAM TRACING: CPT | Performed by: THORACIC SURGERY (CARDIOTHORACIC VASCULAR SURGERY)

## 2025-08-05 PROCEDURE — 25810000003 SODIUM CHLORIDE 0.9 % SOLUTION 250 ML FLEX CONT: Performed by: THORACIC SURGERY (CARDIOTHORACIC VASCULAR SURGERY)

## 2025-08-05 PROCEDURE — 25010000002 AMIODARONE IN DEXTROSE 5% 360-4.14 MG/200ML-% SOLUTION: Performed by: THORACIC SURGERY (CARDIOTHORACIC VASCULAR SURGERY)

## 2025-08-05 PROCEDURE — 85610 PROTHROMBIN TIME: CPT | Performed by: THORACIC SURGERY (CARDIOTHORACIC VASCULAR SURGERY)

## 2025-08-05 PROCEDURE — 25010000002 ENOXAPARIN PER 10 MG: Performed by: THORACIC SURGERY (CARDIOTHORACIC VASCULAR SURGERY)

## 2025-08-05 PROCEDURE — 94799 UNLISTED PULMONARY SVC/PX: CPT

## 2025-08-05 PROCEDURE — 25810000003 LACTATED RINGERS SOLUTION: Performed by: THORACIC SURGERY (CARDIOTHORACIC VASCULAR SURGERY)

## 2025-08-05 PROCEDURE — 93010 ELECTROCARDIOGRAM REPORT: CPT | Performed by: STUDENT IN AN ORGANIZED HEALTH CARE EDUCATION/TRAINING PROGRAM

## 2025-08-05 PROCEDURE — 97162 PT EVAL MOD COMPLEX 30 MIN: CPT

## 2025-08-05 PROCEDURE — 93010 ELECTROCARDIOGRAM REPORT: CPT | Performed by: INTERNAL MEDICINE

## 2025-08-05 PROCEDURE — 82948 REAGENT STRIP/BLOOD GLUCOSE: CPT

## 2025-08-05 PROCEDURE — 82803 BLOOD GASES ANY COMBINATION: CPT

## 2025-08-05 PROCEDURE — 80053 COMPREHEN METABOLIC PANEL: CPT | Performed by: THORACIC SURGERY (CARDIOTHORACIC VASCULAR SURGERY)

## 2025-08-05 PROCEDURE — 25010000002 VANCOMYCIN 1 G RECONSTITUTED SOLUTION 1 EACH VIAL: Performed by: THORACIC SURGERY (CARDIOTHORACIC VASCULAR SURGERY)

## 2025-08-05 PROCEDURE — 85007 BL SMEAR W/DIFF WBC COUNT: CPT | Performed by: THORACIC SURGERY (CARDIOTHORACIC VASCULAR SURGERY)

## 2025-08-05 PROCEDURE — 83735 ASSAY OF MAGNESIUM: CPT | Performed by: THORACIC SURGERY (CARDIOTHORACIC VASCULAR SURGERY)

## 2025-08-05 PROCEDURE — 84100 ASSAY OF PHOSPHORUS: CPT | Performed by: THORACIC SURGERY (CARDIOTHORACIC VASCULAR SURGERY)

## 2025-08-05 PROCEDURE — 97116 GAIT TRAINING THERAPY: CPT

## 2025-08-05 PROCEDURE — 25010000002 NICARDIPINE HCL IN NACL 20-0.9 MG/200ML-% SOLUTION: Performed by: THORACIC SURGERY (CARDIOTHORACIC VASCULAR SURGERY)

## 2025-08-05 PROCEDURE — 94761 N-INVAS EAR/PLS OXIMETRY MLT: CPT

## 2025-08-05 RX ORDER — MAGNESIUM SULFATE HEPTAHYDRATE 40 MG/ML
4 INJECTION, SOLUTION INTRAVENOUS ONCE
Status: COMPLETED | OUTPATIENT
Start: 2025-08-05 | End: 2025-08-05

## 2025-08-05 RX ORDER — INSULIN LISPRO 100 [IU]/ML
2-7 INJECTION, SOLUTION INTRAVENOUS; SUBCUTANEOUS
Status: DISCONTINUED | OUTPATIENT
Start: 2025-08-05 | End: 2025-08-13 | Stop reason: HOSPADM

## 2025-08-05 RX ORDER — DEXTROSE MONOHYDRATE 25 G/50ML
25 INJECTION, SOLUTION INTRAVENOUS
Status: DISCONTINUED | OUTPATIENT
Start: 2025-08-05 | End: 2025-08-13 | Stop reason: HOSPADM

## 2025-08-05 RX ORDER — IBUPROFEN 600 MG/1
1 TABLET ORAL
Status: DISCONTINUED | OUTPATIENT
Start: 2025-08-05 | End: 2025-08-13 | Stop reason: HOSPADM

## 2025-08-05 RX ORDER — METOPROLOL TARTRATE 25 MG/1
25 TABLET, FILM COATED ORAL EVERY 12 HOURS SCHEDULED
Status: DISCONTINUED | OUTPATIENT
Start: 2025-08-05 | End: 2025-08-07

## 2025-08-05 RX ORDER — NICOTINE POLACRILEX 4 MG
15 LOZENGE BUCCAL
Status: DISCONTINUED | OUTPATIENT
Start: 2025-08-05 | End: 2025-08-13 | Stop reason: HOSPADM

## 2025-08-05 RX ORDER — METHOCARBAMOL 500 MG/1
750 TABLET, FILM COATED ORAL EVERY 8 HOURS SCHEDULED
Status: DISCONTINUED | OUTPATIENT
Start: 2025-08-05 | End: 2025-08-07

## 2025-08-05 RX ADMIN — PANTOPRAZOLE SODIUM 40 MG: 40 TABLET, DELAYED RELEASE ORAL at 09:35

## 2025-08-05 RX ADMIN — CEFAZOLIN 2 G: 2 INJECTION, POWDER, FOR SOLUTION INTRAMUSCULAR; INTRAVENOUS at 12:34

## 2025-08-05 RX ADMIN — METHOCARBAMOL TABLETS 750 MG: 500 TABLET, COATED ORAL at 16:57

## 2025-08-05 RX ADMIN — AMIODARONE HYDROCHLORIDE 0.5 MG/MIN: 1.8 INJECTION, SOLUTION INTRAVENOUS at 10:25

## 2025-08-05 RX ADMIN — METHOCARBAMOL TABLETS 750 MG: 500 TABLET, COATED ORAL at 22:32

## 2025-08-05 RX ADMIN — METOPROLOL TARTRATE 25 MG: 25 TABLET, FILM COATED ORAL at 09:35

## 2025-08-05 RX ADMIN — PREGABALIN 25 MG: 25 CAPSULE ORAL at 20:01

## 2025-08-05 RX ADMIN — CEFAZOLIN 2 G: 2 INJECTION, POWDER, FOR SOLUTION INTRAMUSCULAR; INTRAVENOUS at 20:02

## 2025-08-05 RX ADMIN — BISACODYL 10 MG: 5 TABLET, COATED ORAL at 20:01

## 2025-08-05 RX ADMIN — OXYCODONE HYDROCHLORIDE 10 MG: 10 TABLET ORAL at 23:34

## 2025-08-05 RX ADMIN — IPRATROPIUM BROMIDE AND ALBUTEROL SULFATE 1.5 ML: .5; 3 SOLUTION RESPIRATORY (INHALATION) at 06:04

## 2025-08-05 RX ADMIN — MAGNESIUM SULFATE HEPTAHYDRATE 4 G: 40 INJECTION, SOLUTION INTRAVENOUS at 09:34

## 2025-08-05 RX ADMIN — Medication 1 APPLICATION: at 20:01

## 2025-08-05 RX ADMIN — OXYCODONE HYDROCHLORIDE 5 MG: 5 TABLET ORAL at 09:35

## 2025-08-05 RX ADMIN — CLOPIDOGREL BISULFATE 75 MG: 75 TABLET, FILM COATED ORAL at 17:16

## 2025-08-05 RX ADMIN — INSULIN LISPRO 2 UNITS: 100 INJECTION, SOLUTION INTRAVENOUS; SUBCUTANEOUS at 16:58

## 2025-08-05 RX ADMIN — ACETAMINOPHEN 1000 MG: 500 TABLET, FILM COATED ORAL at 22:32

## 2025-08-05 RX ADMIN — CEFAZOLIN 2 G: 2 INJECTION, POWDER, FOR SOLUTION INTRAMUSCULAR; INTRAVENOUS at 04:07

## 2025-08-05 RX ADMIN — ENOXAPARIN SODIUM 40 MG: 100 INJECTION SUBCUTANEOUS at 09:34

## 2025-08-05 RX ADMIN — INSULIN LISPRO 2 UNITS: 100 INJECTION, SOLUTION INTRAVENOUS; SUBCUTANEOUS at 12:54

## 2025-08-05 RX ADMIN — IPRATROPIUM BROMIDE AND ALBUTEROL SULFATE 1.5 ML: .5; 3 SOLUTION RESPIRATORY (INHALATION) at 10:54

## 2025-08-05 RX ADMIN — BISACODYL 10 MG: 5 TABLET, COATED ORAL at 09:35

## 2025-08-05 RX ADMIN — PHENYLEPHRINE HYDROCHLORIDE 0.6 MCG/KG/MIN: 10 INJECTION INTRAVENOUS at 10:24

## 2025-08-05 RX ADMIN — IPRATROPIUM BROMIDE AND ALBUTEROL SULFATE 1.5 ML: .5; 3 SOLUTION RESPIRATORY (INHALATION) at 18:35

## 2025-08-05 RX ADMIN — CHLORHEXIDINE GLUCONATE 1 APPLICATION: 500 CLOTH TOPICAL at 04:00

## 2025-08-05 RX ADMIN — ACETAMINOPHEN 1000 MG: 1000 INJECTION, SOLUTION INTRAVENOUS at 09:33

## 2025-08-05 RX ADMIN — Medication 1 APPLICATION: at 09:35

## 2025-08-05 RX ADMIN — METOPROLOL TARTRATE 25 MG: 25 TABLET, FILM COATED ORAL at 20:02

## 2025-08-05 RX ADMIN — PREGABALIN 25 MG: 25 CAPSULE ORAL at 06:15

## 2025-08-05 RX ADMIN — NICARDIPINE HYDROCHLORIDE 5 MG/HR: 0.1 INJECTION INTRAVENOUS at 06:14

## 2025-08-05 RX ADMIN — ACETAMINOPHEN 1000 MG: 1000 INJECTION, SOLUTION INTRAVENOUS at 02:40

## 2025-08-05 RX ADMIN — AMIODARONE HYDROCHLORIDE 400 MG: 200 TABLET ORAL at 09:34

## 2025-08-05 RX ADMIN — VANCOMYCIN HYDROCHLORIDE 1000 MG: 1 INJECTION, POWDER, LYOPHILIZED, FOR SOLUTION INTRAVENOUS at 06:14

## 2025-08-05 RX ADMIN — SODIUM CHLORIDE 1000 ML: 9 INJECTION, SOLUTION INTRAVENOUS at 09:12

## 2025-08-05 RX ADMIN — AMIODARONE HYDROCHLORIDE 400 MG: 200 TABLET ORAL at 17:16

## 2025-08-05 RX ADMIN — INSULIN LISPRO 2 UNITS: 100 INJECTION, SOLUTION INTRAVENOUS; SUBCUTANEOUS at 22:42

## 2025-08-05 RX ADMIN — ASPIRIN 81 MG CHEWABLE TABLET 162 MG: 81 TABLET CHEWABLE at 09:34

## 2025-08-05 RX ADMIN — IPRATROPIUM BROMIDE AND ALBUTEROL SULFATE 1.5 ML: .5; 3 SOLUTION RESPIRATORY (INHALATION) at 14:57

## 2025-08-05 RX ADMIN — ACETAMINOPHEN 1000 MG: 500 TABLET, FILM COATED ORAL at 16:57

## 2025-08-05 RX ADMIN — SODIUM CHLORIDE, POTASSIUM CHLORIDE, SODIUM LACTATE AND CALCIUM CHLORIDE 1000 ML: 600; 310; 30; 20 INJECTION, SOLUTION INTRAVENOUS at 07:30

## 2025-08-05 RX ADMIN — ATORVASTATIN CALCIUM 20 MG: 10 TABLET ORAL at 20:01

## 2025-08-05 RX ADMIN — LIDOCAINE 2 PATCH: 4 PATCH TOPICAL at 16:57

## 2025-08-05 RX ADMIN — AMIODARONE HYDROCHLORIDE 0.5 MG/MIN: 1.8 INJECTION, SOLUTION INTRAVENOUS at 04:05

## 2025-08-05 RX ADMIN — VANCOMYCIN HYDROCHLORIDE 1000 MG: 1 INJECTION, POWDER, LYOPHILIZED, FOR SOLUTION INTRAVENOUS at 17:16

## 2025-08-05 RX ADMIN — POLYETHYLENE GLYCOL 3350 17 G: 17 POWDER, FOR SOLUTION ORAL at 09:34

## 2025-08-06 ENCOUNTER — APPOINTMENT (OUTPATIENT)
Dept: GENERAL RADIOLOGY | Facility: HOSPITAL | Age: 65
DRG: 236 | End: 2025-08-06
Payer: MEDICARE

## 2025-08-06 LAB
ALBUMIN SERPL-MCNC: 3.2 G/DL (ref 3.5–5.2)
ANION GAP SERPL CALCULATED.3IONS-SCNC: 12 MMOL/L (ref 5–15)
BASOPHILS # BLD AUTO: 0.02 10*3/MM3 (ref 0–0.2)
BASOPHILS NFR BLD AUTO: 0.2 % (ref 0–1.5)
BUN SERPL-MCNC: 20.1 MG/DL (ref 8–23)
BUN/CREAT SERPL: 14.6 (ref 7–25)
CALCIUM SPEC-SCNC: 8.3 MG/DL (ref 8.6–10.5)
CHLORIDE SERPL-SCNC: 101 MMOL/L (ref 98–107)
CO2 SERPL-SCNC: 21 MMOL/L (ref 22–29)
CREAT SERPL-MCNC: 1.38 MG/DL (ref 0.76–1.27)
DEPRECATED RDW RBC AUTO: 45.5 FL (ref 37–54)
EGFRCR SERPLBLD CKD-EPI 2021: 56.4 ML/MIN/1.73
EOSINOPHIL # BLD AUTO: 0 10*3/MM3 (ref 0–0.4)
EOSINOPHIL NFR BLD AUTO: 0 % (ref 0.3–6.2)
ERYTHROCYTE [DISTWIDTH] IN BLOOD BY AUTOMATED COUNT: 13.8 % (ref 12.3–15.4)
GLUCOSE BLDC GLUCOMTR-MCNC: 110 MG/DL (ref 70–130)
GLUCOSE BLDC GLUCOMTR-MCNC: 125 MG/DL (ref 70–130)
GLUCOSE BLDC GLUCOMTR-MCNC: 126 MG/DL (ref 70–130)
GLUCOSE BLDC GLUCOMTR-MCNC: 139 MG/DL (ref 70–130)
GLUCOSE SERPL-MCNC: 153 MG/DL (ref 65–99)
HCT VFR BLD AUTO: 25.8 % (ref 37.5–51)
HGB BLD-MCNC: 8.4 G/DL (ref 13–17.7)
IMM GRANULOCYTES # BLD AUTO: 0.09 10*3/MM3 (ref 0–0.05)
IMM GRANULOCYTES NFR BLD AUTO: 0.7 % (ref 0–0.5)
LYMPHOCYTES # BLD AUTO: 1.63 10*3/MM3 (ref 0.7–3.1)
LYMPHOCYTES NFR BLD AUTO: 12.2 % (ref 19.6–45.3)
MAGNESIUM SERPL-MCNC: 2.1 MG/DL (ref 1.6–2.4)
MCH RBC QN AUTO: 29.5 PG (ref 26.6–33)
MCHC RBC AUTO-ENTMCNC: 32.6 G/DL (ref 31.5–35.7)
MCV RBC AUTO: 90.5 FL (ref 79–97)
MONOCYTES # BLD AUTO: 0.88 10*3/MM3 (ref 0.1–0.9)
MONOCYTES NFR BLD AUTO: 6.6 % (ref 5–12)
NEUTROPHILS NFR BLD AUTO: 10.69 10*3/MM3 (ref 1.7–7)
NEUTROPHILS NFR BLD AUTO: 80.3 % (ref 42.7–76)
NRBC BLD AUTO-RTO: 0.2 /100 WBC (ref 0–0.2)
PHOSPHATE SERPL-MCNC: 2.3 MG/DL (ref 2.5–4.5)
PLATELET # BLD AUTO: 176 10*3/MM3 (ref 140–450)
PMV BLD AUTO: 10.9 FL (ref 6–12)
POTASSIUM SERPL-SCNC: 4 MMOL/L (ref 3.5–5.2)
RBC # BLD AUTO: 2.85 10*6/MM3 (ref 4.14–5.8)
SODIUM SERPL-SCNC: 134 MMOL/L (ref 136–145)
WBC NRBC COR # BLD AUTO: 13.31 10*3/MM3 (ref 3.4–10.8)

## 2025-08-06 PROCEDURE — 25010000002 CEFAZOLIN PER 500 MG: Performed by: THORACIC SURGERY (CARDIOTHORACIC VASCULAR SURGERY)

## 2025-08-06 PROCEDURE — 97116 GAIT TRAINING THERAPY: CPT

## 2025-08-06 PROCEDURE — 80069 RENAL FUNCTION PANEL: CPT | Performed by: THORACIC SURGERY (CARDIOTHORACIC VASCULAR SURGERY)

## 2025-08-06 PROCEDURE — 25010000002 ENOXAPARIN PER 10 MG: Performed by: NURSE PRACTITIONER

## 2025-08-06 PROCEDURE — 25010000002 FUROSEMIDE PER 20 MG: Performed by: NURSE PRACTITIONER

## 2025-08-06 PROCEDURE — 99024 POSTOP FOLLOW-UP VISIT: CPT | Performed by: THORACIC SURGERY (CARDIOTHORACIC VASCULAR SURGERY)

## 2025-08-06 PROCEDURE — 85025 COMPLETE CBC W/AUTO DIFF WBC: CPT | Performed by: THORACIC SURGERY (CARDIOTHORACIC VASCULAR SURGERY)

## 2025-08-06 PROCEDURE — 94799 UNLISTED PULMONARY SVC/PX: CPT

## 2025-08-06 PROCEDURE — 82948 REAGENT STRIP/BLOOD GLUCOSE: CPT

## 2025-08-06 PROCEDURE — 82948 REAGENT STRIP/BLOOD GLUCOSE: CPT | Performed by: THORACIC SURGERY (CARDIOTHORACIC VASCULAR SURGERY)

## 2025-08-06 PROCEDURE — 94664 DEMO&/EVAL PT USE INHALER: CPT

## 2025-08-06 PROCEDURE — 93010 ELECTROCARDIOGRAM REPORT: CPT | Performed by: INTERNAL MEDICINE

## 2025-08-06 PROCEDURE — 93005 ELECTROCARDIOGRAM TRACING: CPT | Performed by: THORACIC SURGERY (CARDIOTHORACIC VASCULAR SURGERY)

## 2025-08-06 PROCEDURE — 94761 N-INVAS EAR/PLS OXIMETRY MLT: CPT

## 2025-08-06 PROCEDURE — 83735 ASSAY OF MAGNESIUM: CPT | Performed by: THORACIC SURGERY (CARDIOTHORACIC VASCULAR SURGERY)

## 2025-08-06 PROCEDURE — 71045 X-RAY EXAM CHEST 1 VIEW: CPT

## 2025-08-06 RX ORDER — POTASSIUM CHLORIDE 1500 MG/1
20 TABLET, EXTENDED RELEASE ORAL 2 TIMES DAILY WITH MEALS
Status: DISCONTINUED | OUTPATIENT
Start: 2025-08-06 | End: 2025-08-07

## 2025-08-06 RX ORDER — FUROSEMIDE 10 MG/ML
20 INJECTION INTRAMUSCULAR; INTRAVENOUS EVERY 12 HOURS
Status: DISCONTINUED | OUTPATIENT
Start: 2025-08-06 | End: 2025-08-09

## 2025-08-06 RX ORDER — IPRATROPIUM BROMIDE AND ALBUTEROL SULFATE 2.5; .5 MG/3ML; MG/3ML
1.5 SOLUTION RESPIRATORY (INHALATION) EVERY 4 HOURS PRN
Status: DISCONTINUED | OUTPATIENT
Start: 2025-08-06 | End: 2025-08-13 | Stop reason: HOSPADM

## 2025-08-06 RX ADMIN — POTASSIUM CHLORIDE 20 MEQ: 1500 TABLET, EXTENDED RELEASE ORAL at 10:19

## 2025-08-06 RX ADMIN — METOPROLOL TARTRATE 25 MG: 25 TABLET, FILM COATED ORAL at 20:42

## 2025-08-06 RX ADMIN — ACETAMINOPHEN 1000 MG: 500 TABLET, FILM COATED ORAL at 10:19

## 2025-08-06 RX ADMIN — PANTOPRAZOLE SODIUM 40 MG: 40 TABLET, DELAYED RELEASE ORAL at 08:22

## 2025-08-06 RX ADMIN — OXYCODONE HYDROCHLORIDE 10 MG: 10 TABLET ORAL at 15:46

## 2025-08-06 RX ADMIN — AMIODARONE HYDROCHLORIDE 400 MG: 200 TABLET ORAL at 08:22

## 2025-08-06 RX ADMIN — LIDOCAINE 2 PATCH: 4 PATCH TOPICAL at 15:55

## 2025-08-06 RX ADMIN — BISACODYL 10 MG: 5 TABLET, COATED ORAL at 20:43

## 2025-08-06 RX ADMIN — OXYCODONE HYDROCHLORIDE 10 MG: 10 TABLET ORAL at 21:09

## 2025-08-06 RX ADMIN — Medication 1 APPLICATION: at 08:23

## 2025-08-06 RX ADMIN — ENOXAPARIN SODIUM 40 MG: 100 INJECTION SUBCUTANEOUS at 10:19

## 2025-08-06 RX ADMIN — POTASSIUM CHLORIDE 20 MEQ: 1500 TABLET, EXTENDED RELEASE ORAL at 17:32

## 2025-08-06 RX ADMIN — POLYETHYLENE GLYCOL 3350 17 G: 17 POWDER, FOR SOLUTION ORAL at 08:22

## 2025-08-06 RX ADMIN — ATORVASTATIN CALCIUM 20 MG: 10 TABLET ORAL at 20:42

## 2025-08-06 RX ADMIN — METOPROLOL TARTRATE 25 MG: 25 TABLET, FILM COATED ORAL at 08:22

## 2025-08-06 RX ADMIN — CLOPIDOGREL BISULFATE 75 MG: 75 TABLET, FILM COATED ORAL at 17:32

## 2025-08-06 RX ADMIN — FUROSEMIDE 20 MG: 10 INJECTION, SOLUTION INTRAMUSCULAR; INTRAVENOUS at 10:19

## 2025-08-06 RX ADMIN — CEFAZOLIN 2 G: 2 INJECTION, POWDER, FOR SOLUTION INTRAMUSCULAR; INTRAVENOUS at 05:00

## 2025-08-06 RX ADMIN — CHLORHEXIDINE GLUCONATE 1 APPLICATION: 500 CLOTH TOPICAL at 04:00

## 2025-08-06 RX ADMIN — ACETAMINOPHEN 1000 MG: 500 TABLET, FILM COATED ORAL at 21:07

## 2025-08-06 RX ADMIN — IPRATROPIUM BROMIDE AND ALBUTEROL SULFATE 1.5 ML: .5; 3 SOLUTION RESPIRATORY (INHALATION) at 06:11

## 2025-08-06 RX ADMIN — METHOCARBAMOL TABLETS 750 MG: 500 TABLET, COATED ORAL at 21:07

## 2025-08-06 RX ADMIN — Medication 1 APPLICATION: at 20:43

## 2025-08-06 RX ADMIN — AMIODARONE HYDROCHLORIDE 400 MG: 200 TABLET ORAL at 17:32

## 2025-08-06 RX ADMIN — ACETAMINOPHEN 1000 MG: 500 TABLET, FILM COATED ORAL at 05:00

## 2025-08-06 RX ADMIN — BISACODYL 10 MG: 5 TABLET, COATED ORAL at 08:22

## 2025-08-06 RX ADMIN — ASPIRIN 81 MG: 81 TABLET, COATED ORAL at 08:22

## 2025-08-06 RX ADMIN — FUROSEMIDE 20 MG: 10 INJECTION, SOLUTION INTRAMUSCULAR; INTRAVENOUS at 21:14

## 2025-08-07 ENCOUNTER — APPOINTMENT (OUTPATIENT)
Dept: GENERAL RADIOLOGY | Facility: HOSPITAL | Age: 65
DRG: 236 | End: 2025-08-07
Payer: MEDICARE

## 2025-08-07 LAB
ALBUMIN SERPL-MCNC: 3.1 G/DL (ref 3.5–5.2)
ALBUMIN/GLOB SERPL: 1.1 G/DL
ALP SERPL-CCNC: 48 U/L (ref 39–117)
ALT SERPL W P-5'-P-CCNC: 20 U/L (ref 1–41)
ANION GAP SERPL CALCULATED.3IONS-SCNC: 10 MMOL/L (ref 5–15)
ANISOCYTOSIS BLD QL: ABNORMAL
AST SERPL-CCNC: 133 U/L (ref 1–40)
BASOPHILS # BLD MANUAL: 0 10*3/MM3 (ref 0–0.2)
BASOPHILS NFR BLD MANUAL: 0 % (ref 0–1.5)
BILIRUB SERPL-MCNC: 0.3 MG/DL (ref 0–1.2)
BUN SERPL-MCNC: 22.2 MG/DL (ref 8–23)
BUN/CREAT SERPL: 15.7 (ref 7–25)
CALCIUM SPEC-SCNC: 8.6 MG/DL (ref 8.6–10.5)
CHLORIDE SERPL-SCNC: 104 MMOL/L (ref 98–107)
CO2 SERPL-SCNC: 21 MMOL/L (ref 22–29)
CREAT SERPL-MCNC: 1.41 MG/DL (ref 0.76–1.27)
DEPRECATED RDW RBC AUTO: 44.2 FL (ref 37–54)
EGFRCR SERPLBLD CKD-EPI 2021: 55 ML/MIN/1.73
EOSINOPHIL # BLD MANUAL: 0 10*3/MM3 (ref 0–0.4)
EOSINOPHIL NFR BLD MANUAL: 0 % (ref 0.3–6.2)
ERYTHROCYTE [DISTWIDTH] IN BLOOD BY AUTOMATED COUNT: 13.9 % (ref 12.3–15.4)
GLOBULIN UR ELPH-MCNC: 2.8 GM/DL
GLUCOSE BLDC GLUCOMTR-MCNC: 112 MG/DL (ref 70–130)
GLUCOSE BLDC GLUCOMTR-MCNC: 118 MG/DL (ref 70–130)
GLUCOSE BLDC GLUCOMTR-MCNC: 125 MG/DL (ref 70–130)
GLUCOSE BLDC GLUCOMTR-MCNC: 125 MG/DL (ref 70–130)
GLUCOSE SERPL-MCNC: 123 MG/DL (ref 65–99)
HCT VFR BLD AUTO: 24.4 % (ref 37.5–51)
HGB BLD-MCNC: 8 G/DL (ref 13–17.7)
LYMPHOCYTES # BLD MANUAL: 1.89 10*3/MM3 (ref 0.7–3.1)
LYMPHOCYTES NFR BLD MANUAL: 7 % (ref 5–12)
MCH RBC QN AUTO: 29.3 PG (ref 26.6–33)
MCHC RBC AUTO-ENTMCNC: 32.8 G/DL (ref 31.5–35.7)
MCV RBC AUTO: 89.4 FL (ref 79–97)
MICROCYTES BLD QL: ABNORMAL
MONOCYTES # BLD: 1.02 10*3/MM3 (ref 0.1–0.9)
NEUTROPHILS # BLD AUTO: 11.6 10*3/MM3 (ref 1.7–7)
NEUTROPHILS NFR BLD MANUAL: 77 % (ref 42.7–76)
NEUTS BAND NFR BLD MANUAL: 3 % (ref 0–5)
NRBC SPEC MANUAL: 3 /100 WBC (ref 0–0.2)
PLAT MORPH BLD: NORMAL
PLATELET # BLD AUTO: 171 10*3/MM3 (ref 140–450)
PMV BLD AUTO: 11.9 FL (ref 6–12)
POLYCHROMASIA BLD QL SMEAR: ABNORMAL
POTASSIUM SERPL-SCNC: 5.2 MMOL/L (ref 3.5–5.2)
PROT SERPL-MCNC: 5.9 G/DL (ref 6–8.5)
RBC # BLD AUTO: 2.73 10*6/MM3 (ref 4.14–5.8)
SODIUM SERPL-SCNC: 135 MMOL/L (ref 136–145)
VARIANT LYMPHS NFR BLD MANUAL: 13 % (ref 19.6–45.3)
WBC MORPH BLD: NORMAL
WBC NRBC COR # BLD AUTO: 14.5 10*3/MM3 (ref 3.4–10.8)

## 2025-08-07 PROCEDURE — 99024 POSTOP FOLLOW-UP VISIT: CPT | Performed by: THORACIC SURGERY (CARDIOTHORACIC VASCULAR SURGERY)

## 2025-08-07 PROCEDURE — 25010000002 FUROSEMIDE PER 20 MG: Performed by: NURSE PRACTITIONER

## 2025-08-07 PROCEDURE — 82948 REAGENT STRIP/BLOOD GLUCOSE: CPT | Performed by: THORACIC SURGERY (CARDIOTHORACIC VASCULAR SURGERY)

## 2025-08-07 PROCEDURE — 80053 COMPREHEN METABOLIC PANEL: CPT | Performed by: THORACIC SURGERY (CARDIOTHORACIC VASCULAR SURGERY)

## 2025-08-07 PROCEDURE — 25010000002 ONDANSETRON PER 1 MG: Performed by: THORACIC SURGERY (CARDIOTHORACIC VASCULAR SURGERY)

## 2025-08-07 PROCEDURE — 85025 COMPLETE CBC W/AUTO DIFF WBC: CPT | Performed by: THORACIC SURGERY (CARDIOTHORACIC VASCULAR SURGERY)

## 2025-08-07 PROCEDURE — 97116 GAIT TRAINING THERAPY: CPT

## 2025-08-07 PROCEDURE — 94799 UNLISTED PULMONARY SVC/PX: CPT

## 2025-08-07 PROCEDURE — 71046 X-RAY EXAM CHEST 2 VIEWS: CPT

## 2025-08-07 PROCEDURE — 25010000002 ENOXAPARIN PER 10 MG: Performed by: NURSE PRACTITIONER

## 2025-08-07 PROCEDURE — 82948 REAGENT STRIP/BLOOD GLUCOSE: CPT

## 2025-08-07 PROCEDURE — 94761 N-INVAS EAR/PLS OXIMETRY MLT: CPT

## 2025-08-07 PROCEDURE — 85007 BL SMEAR W/DIFF WBC COUNT: CPT | Performed by: THORACIC SURGERY (CARDIOTHORACIC VASCULAR SURGERY)

## 2025-08-07 RX ORDER — METOPROLOL TARTRATE 25 MG/1
37.5 TABLET, FILM COATED ORAL EVERY 12 HOURS SCHEDULED
Status: DISCONTINUED | OUTPATIENT
Start: 2025-08-07 | End: 2025-08-08

## 2025-08-07 RX ORDER — PANTOPRAZOLE SODIUM 40 MG/1
40 TABLET, DELAYED RELEASE ORAL
Status: DISCONTINUED | OUTPATIENT
Start: 2025-08-07 | End: 2025-08-08 | Stop reason: ALTCHOICE

## 2025-08-07 RX ORDER — POTASSIUM CHLORIDE 1500 MG/1
20 TABLET, EXTENDED RELEASE ORAL DAILY
Status: DISCONTINUED | OUTPATIENT
Start: 2025-08-08 | End: 2025-08-11

## 2025-08-07 RX ORDER — OXYCODONE HYDROCHLORIDE 5 MG/1
5 TABLET ORAL EVERY 4 HOURS PRN
Refills: 0 | Status: DISCONTINUED | OUTPATIENT
Start: 2025-08-07 | End: 2025-08-07

## 2025-08-07 RX ORDER — TRAMADOL HYDROCHLORIDE 50 MG/1
50 TABLET ORAL EVERY 6 HOURS PRN
Status: DISPENSED | OUTPATIENT
Start: 2025-08-07 | End: 2025-08-12

## 2025-08-07 RX ORDER — OXYCODONE HYDROCHLORIDE 10 MG/1
10 TABLET ORAL EVERY 6 HOURS PRN
Refills: 0 | Status: DISCONTINUED | OUTPATIENT
Start: 2025-08-07 | End: 2025-08-07

## 2025-08-07 RX ORDER — METHOCARBAMOL 500 MG/1
500 TABLET, FILM COATED ORAL EVERY 8 HOURS SCHEDULED
Status: DISCONTINUED | OUTPATIENT
Start: 2025-08-07 | End: 2025-08-13 | Stop reason: HOSPADM

## 2025-08-07 RX ADMIN — ACETAMINOPHEN 1000 MG: 500 TABLET, FILM COATED ORAL at 09:01

## 2025-08-07 RX ADMIN — ONDANSETRON 4 MG: 2 INJECTION INTRAMUSCULAR; INTRAVENOUS at 05:14

## 2025-08-07 RX ADMIN — OXYCODONE HYDROCHLORIDE 5 MG: 5 TABLET ORAL at 09:01

## 2025-08-07 RX ADMIN — METHOCARBAMOL TABLETS 500 MG: 500 TABLET, COATED ORAL at 15:23

## 2025-08-07 RX ADMIN — CHLORHEXIDINE GLUCONATE 1 APPLICATION: 500 CLOTH TOPICAL at 05:10

## 2025-08-07 RX ADMIN — METHOCARBAMOL TABLETS 750 MG: 500 TABLET, COATED ORAL at 05:09

## 2025-08-07 RX ADMIN — Medication 1 APPLICATION: at 08:41

## 2025-08-07 RX ADMIN — METOPROLOL TARTRATE 25 MG: 25 TABLET, FILM COATED ORAL at 08:40

## 2025-08-07 RX ADMIN — ENOXAPARIN SODIUM 40 MG: 100 INJECTION SUBCUTANEOUS at 08:40

## 2025-08-07 RX ADMIN — PANTOPRAZOLE SODIUM 40 MG: 40 TABLET, DELAYED RELEASE ORAL at 08:40

## 2025-08-07 RX ADMIN — FUROSEMIDE 20 MG: 10 INJECTION, SOLUTION INTRAMUSCULAR; INTRAVENOUS at 22:09

## 2025-08-07 RX ADMIN — ACETAMINOPHEN 1000 MG: 500 TABLET, FILM COATED ORAL at 15:23

## 2025-08-07 RX ADMIN — METHOCARBAMOL TABLETS 500 MG: 500 TABLET, COATED ORAL at 22:11

## 2025-08-07 RX ADMIN — Medication 1 APPLICATION: at 20:29

## 2025-08-07 RX ADMIN — LIDOCAINE 2 PATCH: 4 PATCH TOPICAL at 15:28

## 2025-08-07 RX ADMIN — CLOPIDOGREL BISULFATE 75 MG: 75 TABLET, FILM COATED ORAL at 18:43

## 2025-08-07 RX ADMIN — ATORVASTATIN CALCIUM 20 MG: 10 TABLET ORAL at 20:28

## 2025-08-07 RX ADMIN — TRAMADOL HYDROCHLORIDE 50 MG: 50 TABLET, COATED ORAL at 20:27

## 2025-08-07 RX ADMIN — ASPIRIN 81 MG: 81 TABLET, COATED ORAL at 08:40

## 2025-08-07 RX ADMIN — AMIODARONE HYDROCHLORIDE 400 MG: 200 TABLET ORAL at 08:40

## 2025-08-07 RX ADMIN — BISACODYL 10 MG: 5 TABLET, COATED ORAL at 20:27

## 2025-08-07 RX ADMIN — ACETAMINOPHEN 1000 MG: 500 TABLET, FILM COATED ORAL at 05:09

## 2025-08-07 RX ADMIN — AMIODARONE HYDROCHLORIDE 400 MG: 200 TABLET ORAL at 18:43

## 2025-08-07 RX ADMIN — FUROSEMIDE 20 MG: 10 INJECTION, SOLUTION INTRAMUSCULAR; INTRAVENOUS at 09:01

## 2025-08-07 RX ADMIN — PANTOPRAZOLE SODIUM 40 MG: 40 TABLET, DELAYED RELEASE ORAL at 06:38

## 2025-08-07 RX ADMIN — ACETAMINOPHEN 1000 MG: 500 TABLET, FILM COATED ORAL at 22:08

## 2025-08-07 RX ADMIN — METOPROLOL TARTRATE 37.5 MG: 25 TABLET, FILM COATED ORAL at 20:28

## 2025-08-07 RX ADMIN — POTASSIUM CHLORIDE 20 MEQ: 1500 TABLET, EXTENDED RELEASE ORAL at 08:40

## 2025-08-08 PROBLEM — N18.31 STAGE 3A CHRONIC KIDNEY DISEASE: Status: ACTIVE | Noted: 2025-08-08

## 2025-08-08 PROBLEM — E66.812 CLASS 2 OBESITY: Status: ACTIVE | Noted: 2025-08-08

## 2025-08-08 PROBLEM — Z87.891 FORMER SMOKER: Status: ACTIVE | Noted: 2025-08-08

## 2025-08-08 PROBLEM — E78.5 HLD (HYPERLIPIDEMIA): Status: ACTIVE | Noted: 2025-08-08

## 2025-08-08 PROBLEM — I48.0 PAF (PAROXYSMAL ATRIAL FIBRILLATION): Status: ACTIVE | Noted: 2025-08-08

## 2025-08-08 LAB
ANION GAP SERPL CALCULATED.3IONS-SCNC: 14 MMOL/L (ref 5–15)
BUN SERPL-MCNC: 29.9 MG/DL (ref 8–23)
BUN/CREAT SERPL: 18.8 (ref 7–25)
CALCIUM SPEC-SCNC: 8.5 MG/DL (ref 8.6–10.5)
CHLORIDE SERPL-SCNC: 99 MMOL/L (ref 98–107)
CO2 SERPL-SCNC: 21 MMOL/L (ref 22–29)
CREAT SERPL-MCNC: 1.59 MG/DL (ref 0.76–1.27)
DEPRECATED RDW RBC AUTO: 46.5 FL (ref 37–54)
EGFRCR SERPLBLD CKD-EPI 2021: 47.6 ML/MIN/1.73
ERYTHROCYTE [DISTWIDTH] IN BLOOD BY AUTOMATED COUNT: 13.6 % (ref 12.3–15.4)
GLUCOSE BLDC GLUCOMTR-MCNC: 104 MG/DL (ref 70–130)
GLUCOSE BLDC GLUCOMTR-MCNC: 104 MG/DL (ref 70–130)
GLUCOSE BLDC GLUCOMTR-MCNC: 116 MG/DL (ref 70–130)
GLUCOSE BLDC GLUCOMTR-MCNC: 128 MG/DL (ref 70–130)
GLUCOSE SERPL-MCNC: 117 MG/DL (ref 65–99)
HCT VFR BLD AUTO: 24.5 % (ref 37.5–51)
HGB BLD-MCNC: 7.7 G/DL (ref 13–17.7)
MCH RBC QN AUTO: 29.7 PG (ref 26.6–33)
MCHC RBC AUTO-ENTMCNC: 31.4 G/DL (ref 31.5–35.7)
MCV RBC AUTO: 94.6 FL (ref 79–97)
PLATELET # BLD AUTO: 255 10*3/MM3 (ref 140–450)
PMV BLD AUTO: 11 FL (ref 6–12)
POTASSIUM SERPL-SCNC: 4.1 MMOL/L (ref 3.5–5.2)
RBC # BLD AUTO: 2.59 10*6/MM3 (ref 4.14–5.8)
SODIUM SERPL-SCNC: 134 MMOL/L (ref 136–145)
WBC NRBC COR # BLD AUTO: 11.34 10*3/MM3 (ref 3.4–10.8)

## 2025-08-08 PROCEDURE — 82948 REAGENT STRIP/BLOOD GLUCOSE: CPT | Performed by: THORACIC SURGERY (CARDIOTHORACIC VASCULAR SURGERY)

## 2025-08-08 PROCEDURE — 25010000002 FUROSEMIDE PER 20 MG: Performed by: NURSE PRACTITIONER

## 2025-08-08 PROCEDURE — 97116 GAIT TRAINING THERAPY: CPT

## 2025-08-08 PROCEDURE — 80048 BASIC METABOLIC PNL TOTAL CA: CPT | Performed by: NURSE PRACTITIONER

## 2025-08-08 PROCEDURE — 82948 REAGENT STRIP/BLOOD GLUCOSE: CPT

## 2025-08-08 PROCEDURE — 99024 POSTOP FOLLOW-UP VISIT: CPT | Performed by: THORACIC SURGERY (CARDIOTHORACIC VASCULAR SURGERY)

## 2025-08-08 PROCEDURE — 25010000002 ENOXAPARIN PER 10 MG: Performed by: NURSE PRACTITIONER

## 2025-08-08 PROCEDURE — 85027 COMPLETE CBC AUTOMATED: CPT | Performed by: NURSE PRACTITIONER

## 2025-08-08 RX ORDER — METOPROLOL TARTRATE 50 MG
50 TABLET ORAL EVERY 12 HOURS SCHEDULED
Status: DISCONTINUED | OUTPATIENT
Start: 2025-08-08 | End: 2025-08-13 | Stop reason: HOSPADM

## 2025-08-08 RX ORDER — AMIODARONE HYDROCHLORIDE 200 MG/1
400 TABLET ORAL 3 TIMES DAILY
Status: DISCONTINUED | OUTPATIENT
Start: 2025-08-08 | End: 2025-08-13 | Stop reason: HOSPADM

## 2025-08-08 RX ORDER — FAMOTIDINE 20 MG/1
20 TABLET, FILM COATED ORAL
Status: COMPLETED | OUTPATIENT
Start: 2025-08-08 | End: 2025-08-10

## 2025-08-08 RX ORDER — PANTOPRAZOLE SODIUM 40 MG/1
40 TABLET, DELAYED RELEASE ORAL
Status: COMPLETED | OUTPATIENT
Start: 2025-08-09 | End: 2025-08-11

## 2025-08-08 RX ADMIN — METHOCARBAMOL TABLETS 500 MG: 500 TABLET, COATED ORAL at 20:49

## 2025-08-08 RX ADMIN — CLOPIDOGREL BISULFATE 75 MG: 75 TABLET, FILM COATED ORAL at 18:05

## 2025-08-08 RX ADMIN — ENOXAPARIN SODIUM 40 MG: 100 INJECTION SUBCUTANEOUS at 08:44

## 2025-08-08 RX ADMIN — METHOCARBAMOL TABLETS 500 MG: 500 TABLET, COATED ORAL at 05:27

## 2025-08-08 RX ADMIN — AMIODARONE HYDROCHLORIDE 400 MG: 200 TABLET ORAL at 08:45

## 2025-08-08 RX ADMIN — FUROSEMIDE 20 MG: 10 INJECTION, SOLUTION INTRAMUSCULAR; INTRAVENOUS at 20:50

## 2025-08-08 RX ADMIN — TRAMADOL HYDROCHLORIDE 50 MG: 50 TABLET, COATED ORAL at 14:28

## 2025-08-08 RX ADMIN — ATORVASTATIN CALCIUM 20 MG: 10 TABLET ORAL at 20:50

## 2025-08-08 RX ADMIN — ACETAMINOPHEN 1000 MG: 500 TABLET, FILM COATED ORAL at 18:05

## 2025-08-08 RX ADMIN — ACETAMINOPHEN 1000 MG: 500 TABLET, FILM COATED ORAL at 05:27

## 2025-08-08 RX ADMIN — AMIODARONE HYDROCHLORIDE 400 MG: 200 TABLET ORAL at 20:53

## 2025-08-08 RX ADMIN — METOPROLOL TARTRATE 50 MG: 50 TABLET, FILM COATED ORAL at 08:45

## 2025-08-08 RX ADMIN — FAMOTIDINE 20 MG: 20 TABLET, FILM COATED ORAL at 18:04

## 2025-08-08 RX ADMIN — ASPIRIN 81 MG: 81 TABLET, COATED ORAL at 08:45

## 2025-08-08 RX ADMIN — FUROSEMIDE 20 MG: 10 INJECTION, SOLUTION INTRAMUSCULAR; INTRAVENOUS at 11:22

## 2025-08-08 RX ADMIN — FAMOTIDINE 20 MG: 20 TABLET, FILM COATED ORAL at 12:17

## 2025-08-08 RX ADMIN — POTASSIUM CHLORIDE 20 MEQ: 1500 TABLET, EXTENDED RELEASE ORAL at 08:45

## 2025-08-08 RX ADMIN — METOPROLOL TARTRATE 50 MG: 50 TABLET, FILM COATED ORAL at 20:49

## 2025-08-08 RX ADMIN — LIDOCAINE 2 PATCH: 4 PATCH TOPICAL at 18:05

## 2025-08-08 RX ADMIN — Medication 1 APPLICATION: at 08:45

## 2025-08-08 RX ADMIN — TRAMADOL HYDROCHLORIDE 50 MG: 50 TABLET, COATED ORAL at 20:50

## 2025-08-08 RX ADMIN — ACETAMINOPHEN 1000 MG: 500 TABLET, FILM COATED ORAL at 20:50

## 2025-08-08 RX ADMIN — AMIODARONE HYDROCHLORIDE 400 MG: 200 TABLET ORAL at 18:05

## 2025-08-08 RX ADMIN — METHOCARBAMOL TABLETS 500 MG: 500 TABLET, COATED ORAL at 14:28

## 2025-08-08 RX ADMIN — PANTOPRAZOLE SODIUM 40 MG: 40 TABLET, DELAYED RELEASE ORAL at 05:27

## 2025-08-08 RX ADMIN — ACETAMINOPHEN 1000 MG: 500 TABLET, FILM COATED ORAL at 11:22

## 2025-08-09 ENCOUNTER — APPOINTMENT (OUTPATIENT)
Dept: GENERAL RADIOLOGY | Facility: HOSPITAL | Age: 65
DRG: 236 | End: 2025-08-09
Payer: MEDICARE

## 2025-08-09 LAB
ANION GAP SERPL CALCULATED.3IONS-SCNC: 11 MMOL/L (ref 5–15)
ANION GAP SERPL CALCULATED.3IONS-SCNC: 13 MMOL/L (ref 5–15)
BUN SERPL-MCNC: 30.8 MG/DL (ref 8–23)
BUN SERPL-MCNC: 33.9 MG/DL (ref 8–23)
BUN/CREAT SERPL: 19.3 (ref 7–25)
BUN/CREAT SERPL: 21.1 (ref 7–25)
CALCIUM SPEC-SCNC: 8.5 MG/DL (ref 8.6–10.5)
CALCIUM SPEC-SCNC: 9.1 MG/DL (ref 8.6–10.5)
CHLORIDE SERPL-SCNC: 100 MMOL/L (ref 98–107)
CHLORIDE SERPL-SCNC: 102 MMOL/L (ref 98–107)
CO2 SERPL-SCNC: 24 MMOL/L (ref 22–29)
CO2 SERPL-SCNC: 24 MMOL/L (ref 22–29)
CREAT SERPL-MCNC: 1.6 MG/DL (ref 0.76–1.27)
CREAT SERPL-MCNC: 1.61 MG/DL (ref 0.76–1.27)
DEPRECATED RDW RBC AUTO: 45.3 FL (ref 37–54)
DEPRECATED RDW RBC AUTO: 46.9 FL (ref 37–54)
EGFRCR SERPLBLD CKD-EPI 2021: 46.9 ML/MIN/1.73
EGFRCR SERPLBLD CKD-EPI 2021: 47.2 ML/MIN/1.73
ERYTHROCYTE [DISTWIDTH] IN BLOOD BY AUTOMATED COUNT: 13.7 % (ref 12.3–15.4)
ERYTHROCYTE [DISTWIDTH] IN BLOOD BY AUTOMATED COUNT: 13.8 % (ref 12.3–15.4)
GLUCOSE BLDC GLUCOMTR-MCNC: 101 MG/DL (ref 70–130)
GLUCOSE BLDC GLUCOMTR-MCNC: 105 MG/DL (ref 70–130)
GLUCOSE BLDC GLUCOMTR-MCNC: 122 MG/DL (ref 70–130)
GLUCOSE BLDC GLUCOMTR-MCNC: 148 MG/DL (ref 70–130)
GLUCOSE SERPL-MCNC: 107 MG/DL (ref 65–99)
GLUCOSE SERPL-MCNC: 117 MG/DL (ref 65–99)
HCT VFR BLD AUTO: 24.6 % (ref 37.5–51)
HCT VFR BLD AUTO: 28.9 % (ref 37.5–51)
HGB BLD-MCNC: 7.5 G/DL (ref 13–17.7)
HGB BLD-MCNC: 9.1 G/DL (ref 13–17.7)
MCH RBC QN AUTO: 29.4 PG (ref 26.6–33)
MCH RBC QN AUTO: 29.5 PG (ref 26.6–33)
MCHC RBC AUTO-ENTMCNC: 30.5 G/DL (ref 31.5–35.7)
MCHC RBC AUTO-ENTMCNC: 31.5 G/DL (ref 31.5–35.7)
MCV RBC AUTO: 93.8 FL (ref 79–97)
MCV RBC AUTO: 96.5 FL (ref 79–97)
PLATELET # BLD AUTO: 266 10*3/MM3 (ref 140–450)
PLATELET # BLD AUTO: 313 10*3/MM3 (ref 140–450)
PMV BLD AUTO: 10.4 FL (ref 6–12)
PMV BLD AUTO: 10.6 FL (ref 6–12)
POTASSIUM SERPL-SCNC: 4.1 MMOL/L (ref 3.5–5.2)
POTASSIUM SERPL-SCNC: 4.3 MMOL/L (ref 3.5–5.2)
QT INTERVAL: 332 MS
QT INTERVAL: 342 MS
QT INTERVAL: 400 MS
QTC INTERVAL: 366 MS
QTC INTERVAL: 421 MS
QTC INTERVAL: 438 MS
RBC # BLD AUTO: 2.55 10*6/MM3 (ref 4.14–5.8)
RBC # BLD AUTO: 3.08 10*6/MM3 (ref 4.14–5.8)
SODIUM SERPL-SCNC: 137 MMOL/L (ref 136–145)
SODIUM SERPL-SCNC: 137 MMOL/L (ref 136–145)
WBC NRBC COR # BLD AUTO: 10.33 10*3/MM3 (ref 3.4–10.8)
WBC NRBC COR # BLD AUTO: 10.43 10*3/MM3 (ref 3.4–10.8)

## 2025-08-09 PROCEDURE — 99024 POSTOP FOLLOW-UP VISIT: CPT | Performed by: SURGERY

## 2025-08-09 PROCEDURE — 80048 BASIC METABOLIC PNL TOTAL CA: CPT | Performed by: NURSE PRACTITIONER

## 2025-08-09 PROCEDURE — 82948 REAGENT STRIP/BLOOD GLUCOSE: CPT

## 2025-08-09 PROCEDURE — 85027 COMPLETE CBC AUTOMATED: CPT | Performed by: NURSE PRACTITIONER

## 2025-08-09 PROCEDURE — 25010000002 ENOXAPARIN PER 10 MG: Performed by: NURSE PRACTITIONER

## 2025-08-09 PROCEDURE — 82948 REAGENT STRIP/BLOOD GLUCOSE: CPT | Performed by: THORACIC SURGERY (CARDIOTHORACIC VASCULAR SURGERY)

## 2025-08-09 PROCEDURE — 97116 GAIT TRAINING THERAPY: CPT

## 2025-08-09 PROCEDURE — 71046 X-RAY EXAM CHEST 2 VIEWS: CPT

## 2025-08-09 RX ORDER — FUROSEMIDE 10 MG/ML
20 INJECTION INTRAMUSCULAR; INTRAVENOUS DAILY
Status: DISCONTINUED | OUTPATIENT
Start: 2025-08-10 | End: 2025-08-11

## 2025-08-09 RX ADMIN — CLOPIDOGREL BISULFATE 75 MG: 75 TABLET, FILM COATED ORAL at 17:20

## 2025-08-09 RX ADMIN — METOPROLOL TARTRATE 50 MG: 50 TABLET, FILM COATED ORAL at 21:21

## 2025-08-09 RX ADMIN — METHOCARBAMOL TABLETS 500 MG: 500 TABLET, COATED ORAL at 21:21

## 2025-08-09 RX ADMIN — ACETAMINOPHEN 1000 MG: 500 TABLET, FILM COATED ORAL at 21:21

## 2025-08-09 RX ADMIN — ACETAMINOPHEN 1000 MG: 500 TABLET, FILM COATED ORAL at 03:10

## 2025-08-09 RX ADMIN — ATORVASTATIN CALCIUM 20 MG: 10 TABLET ORAL at 21:21

## 2025-08-09 RX ADMIN — PANTOPRAZOLE SODIUM 40 MG: 40 TABLET, DELAYED RELEASE ORAL at 06:13

## 2025-08-09 RX ADMIN — AMIODARONE HYDROCHLORIDE 400 MG: 200 TABLET ORAL at 21:21

## 2025-08-09 RX ADMIN — LIDOCAINE 2 PATCH: 4 PATCH TOPICAL at 15:05

## 2025-08-09 RX ADMIN — METHOCARBAMOL TABLETS 500 MG: 500 TABLET, COATED ORAL at 15:03

## 2025-08-09 RX ADMIN — FAMOTIDINE 20 MG: 20 TABLET, FILM COATED ORAL at 17:20

## 2025-08-09 RX ADMIN — FAMOTIDINE 20 MG: 20 TABLET, FILM COATED ORAL at 08:41

## 2025-08-09 RX ADMIN — ASPIRIN 81 MG: 81 TABLET, COATED ORAL at 08:41

## 2025-08-09 RX ADMIN — METOPROLOL TARTRATE 50 MG: 50 TABLET, FILM COATED ORAL at 08:41

## 2025-08-09 RX ADMIN — POTASSIUM CHLORIDE 20 MEQ: 1500 TABLET, EXTENDED RELEASE ORAL at 08:40

## 2025-08-09 RX ADMIN — ACETAMINOPHEN 1000 MG: 500 TABLET, FILM COATED ORAL at 11:02

## 2025-08-09 RX ADMIN — AMIODARONE HYDROCHLORIDE 400 MG: 200 TABLET ORAL at 08:40

## 2025-08-09 RX ADMIN — AMIODARONE HYDROCHLORIDE 400 MG: 200 TABLET ORAL at 15:03

## 2025-08-09 RX ADMIN — METHOCARBAMOL TABLETS 500 MG: 500 TABLET, COATED ORAL at 06:13

## 2025-08-09 RX ADMIN — BISACODYL 10 MG: 5 TABLET, COATED ORAL at 21:21

## 2025-08-09 RX ADMIN — ENOXAPARIN SODIUM 40 MG: 100 INJECTION SUBCUTANEOUS at 08:40

## 2025-08-09 RX ADMIN — ACETAMINOPHEN 1000 MG: 500 TABLET, FILM COATED ORAL at 15:03

## 2025-08-10 LAB
ANION GAP SERPL CALCULATED.3IONS-SCNC: 13 MMOL/L (ref 5–15)
BUN SERPL-MCNC: 29.2 MG/DL (ref 8–23)
BUN/CREAT SERPL: 20.1 (ref 7–25)
CALCIUM SPEC-SCNC: 8.6 MG/DL (ref 8.6–10.5)
CHLORIDE SERPL-SCNC: 104 MMOL/L (ref 98–107)
CO2 SERPL-SCNC: 20 MMOL/L (ref 22–29)
CREAT SERPL-MCNC: 1.45 MG/DL (ref 0.76–1.27)
DEPRECATED RDW RBC AUTO: 44.3 FL (ref 37–54)
EGFRCR SERPLBLD CKD-EPI 2021: 53.1 ML/MIN/1.73
ERYTHROCYTE [DISTWIDTH] IN BLOOD BY AUTOMATED COUNT: 14.1 % (ref 12.3–15.4)
GLUCOSE BLDC GLUCOMTR-MCNC: 104 MG/DL (ref 70–130)
GLUCOSE BLDC GLUCOMTR-MCNC: 109 MG/DL (ref 70–130)
GLUCOSE BLDC GLUCOMTR-MCNC: 111 MG/DL (ref 70–130)
GLUCOSE BLDC GLUCOMTR-MCNC: 126 MG/DL (ref 70–130)
GLUCOSE SERPL-MCNC: 111 MG/DL (ref 65–99)
HCT VFR BLD AUTO: 26 % (ref 37.5–51)
HGB BLD-MCNC: 8.6 G/DL (ref 13–17.7)
MCH RBC QN AUTO: 30.1 PG (ref 26.6–33)
MCHC RBC AUTO-ENTMCNC: 33.1 G/DL (ref 31.5–35.7)
MCV RBC AUTO: 90.9 FL (ref 79–97)
PLATELET # BLD AUTO: 300 10*3/MM3 (ref 140–450)
PMV BLD AUTO: 10.6 FL (ref 6–12)
POTASSIUM SERPL-SCNC: 4.7 MMOL/L (ref 3.5–5.2)
QT INTERVAL: 374 MS
QTC INTERVAL: 417 MS
RBC # BLD AUTO: 2.86 10*6/MM3 (ref 4.14–5.8)
SODIUM SERPL-SCNC: 137 MMOL/L (ref 136–145)
WBC NRBC COR # BLD AUTO: 11.41 10*3/MM3 (ref 3.4–10.8)

## 2025-08-10 PROCEDURE — 82948 REAGENT STRIP/BLOOD GLUCOSE: CPT

## 2025-08-10 PROCEDURE — 97116 GAIT TRAINING THERAPY: CPT

## 2025-08-10 PROCEDURE — 82948 REAGENT STRIP/BLOOD GLUCOSE: CPT | Performed by: THORACIC SURGERY (CARDIOTHORACIC VASCULAR SURGERY)

## 2025-08-10 PROCEDURE — 93005 ELECTROCARDIOGRAM TRACING: CPT | Performed by: THORACIC SURGERY (CARDIOTHORACIC VASCULAR SURGERY)

## 2025-08-10 PROCEDURE — 99024 POSTOP FOLLOW-UP VISIT: CPT | Performed by: SURGERY

## 2025-08-10 PROCEDURE — 93010 ELECTROCARDIOGRAM REPORT: CPT | Performed by: INTERNAL MEDICINE

## 2025-08-10 PROCEDURE — 80048 BASIC METABOLIC PNL TOTAL CA: CPT | Performed by: NURSE PRACTITIONER

## 2025-08-10 PROCEDURE — 85027 COMPLETE CBC AUTOMATED: CPT | Performed by: NURSE PRACTITIONER

## 2025-08-10 PROCEDURE — 25010000002 FUROSEMIDE PER 20 MG: Performed by: NURSE PRACTITIONER

## 2025-08-10 PROCEDURE — 25010000002 ENOXAPARIN PER 10 MG: Performed by: NURSE PRACTITIONER

## 2025-08-10 RX ADMIN — METHOCARBAMOL TABLETS 500 MG: 500 TABLET, COATED ORAL at 15:40

## 2025-08-10 RX ADMIN — BISACODYL 10 MG: 5 TABLET, COATED ORAL at 21:02

## 2025-08-10 RX ADMIN — ATORVASTATIN CALCIUM 20 MG: 10 TABLET ORAL at 21:03

## 2025-08-10 RX ADMIN — PANTOPRAZOLE SODIUM 40 MG: 40 TABLET, DELAYED RELEASE ORAL at 05:38

## 2025-08-10 RX ADMIN — AMIODARONE HYDROCHLORIDE 400 MG: 200 TABLET ORAL at 15:40

## 2025-08-10 RX ADMIN — ASPIRIN 81 MG: 81 TABLET, COATED ORAL at 08:27

## 2025-08-10 RX ADMIN — FAMOTIDINE 20 MG: 20 TABLET, FILM COATED ORAL at 17:50

## 2025-08-10 RX ADMIN — TRAMADOL HYDROCHLORIDE 50 MG: 50 TABLET, COATED ORAL at 21:03

## 2025-08-10 RX ADMIN — ACETAMINOPHEN 1000 MG: 500 TABLET, FILM COATED ORAL at 05:38

## 2025-08-10 RX ADMIN — POTASSIUM CHLORIDE 20 MEQ: 1500 TABLET, EXTENDED RELEASE ORAL at 08:27

## 2025-08-10 RX ADMIN — METOPROLOL TARTRATE 50 MG: 50 TABLET, FILM COATED ORAL at 21:03

## 2025-08-10 RX ADMIN — METHOCARBAMOL TABLETS 500 MG: 500 TABLET, COATED ORAL at 05:38

## 2025-08-10 RX ADMIN — TRAMADOL HYDROCHLORIDE 50 MG: 50 TABLET, COATED ORAL at 05:38

## 2025-08-10 RX ADMIN — CLOPIDOGREL BISULFATE 75 MG: 75 TABLET, FILM COATED ORAL at 17:50

## 2025-08-10 RX ADMIN — ENOXAPARIN SODIUM 40 MG: 100 INJECTION SUBCUTANEOUS at 08:26

## 2025-08-10 RX ADMIN — ACETAMINOPHEN 1000 MG: 500 TABLET, FILM COATED ORAL at 21:03

## 2025-08-10 RX ADMIN — FUROSEMIDE 20 MG: 10 INJECTION, SOLUTION INTRAMUSCULAR; INTRAVENOUS at 08:28

## 2025-08-10 RX ADMIN — ACETAMINOPHEN 1000 MG: 500 TABLET, FILM COATED ORAL at 15:40

## 2025-08-10 RX ADMIN — METHOCARBAMOL TABLETS 500 MG: 500 TABLET, COATED ORAL at 21:02

## 2025-08-10 RX ADMIN — AMIODARONE HYDROCHLORIDE 400 MG: 200 TABLET ORAL at 21:02

## 2025-08-10 RX ADMIN — FAMOTIDINE 20 MG: 20 TABLET, FILM COATED ORAL at 08:26

## 2025-08-10 RX ADMIN — LIDOCAINE 2 PATCH: 4 PATCH TOPICAL at 15:39

## 2025-08-10 RX ADMIN — METOPROLOL TARTRATE 50 MG: 50 TABLET, FILM COATED ORAL at 08:27

## 2025-08-10 RX ADMIN — AMIODARONE HYDROCHLORIDE 400 MG: 200 TABLET ORAL at 08:26

## 2025-08-10 RX ADMIN — ACETAMINOPHEN 1000 MG: 500 TABLET, FILM COATED ORAL at 11:54

## 2025-08-11 ENCOUNTER — APPOINTMENT (OUTPATIENT)
Dept: GENERAL RADIOLOGY | Facility: HOSPITAL | Age: 65
DRG: 236 | End: 2025-08-11
Payer: MEDICARE

## 2025-08-11 PROBLEM — I10 HTN (HYPERTENSION): Status: ACTIVE | Noted: 2025-08-11

## 2025-08-11 PROBLEM — R91.1 LUNG NODULE: Status: ACTIVE | Noted: 2025-08-11

## 2025-08-11 LAB
ANION GAP SERPL CALCULATED.3IONS-SCNC: 11 MMOL/L (ref 5–15)
BUN SERPL-MCNC: 25.4 MG/DL (ref 8–23)
BUN/CREAT SERPL: 17.2 (ref 7–25)
CALCIUM SPEC-SCNC: 9 MG/DL (ref 8.6–10.5)
CHLORIDE SERPL-SCNC: 101 MMOL/L (ref 98–107)
CO2 SERPL-SCNC: 24 MMOL/L (ref 22–29)
CREAT SERPL-MCNC: 1.48 MG/DL (ref 0.76–1.27)
DEPRECATED RDW RBC AUTO: 47 FL (ref 37–54)
EGFRCR SERPLBLD CKD-EPI 2021: 51.9 ML/MIN/1.73
ERYTHROCYTE [DISTWIDTH] IN BLOOD BY AUTOMATED COUNT: 15.4 % (ref 12.3–15.4)
GLUCOSE BLDC GLUCOMTR-MCNC: 125 MG/DL (ref 70–130)
GLUCOSE BLDC GLUCOMTR-MCNC: 152 MG/DL (ref 70–130)
GLUCOSE BLDC GLUCOMTR-MCNC: 84 MG/DL (ref 70–130)
GLUCOSE BLDC GLUCOMTR-MCNC: 98 MG/DL (ref 70–130)
GLUCOSE SERPL-MCNC: 123 MG/DL (ref 65–99)
HCT VFR BLD AUTO: 28.9 % (ref 37.5–51)
HGB BLD-MCNC: 9 G/DL (ref 13–17.7)
MCH RBC QN AUTO: 29.6 PG (ref 26.6–33)
MCHC RBC AUTO-ENTMCNC: 31.1 G/DL (ref 31.5–35.7)
MCV RBC AUTO: 95.1 FL (ref 79–97)
PLATELET # BLD AUTO: 379 10*3/MM3 (ref 140–450)
PMV BLD AUTO: 10.1 FL (ref 6–12)
POTASSIUM SERPL-SCNC: 3.6 MMOL/L (ref 3.5–5.2)
RBC # BLD AUTO: 3.04 10*6/MM3 (ref 4.14–5.8)
SODIUM SERPL-SCNC: 136 MMOL/L (ref 136–145)
WBC NRBC COR # BLD AUTO: 9.38 10*3/MM3 (ref 3.4–10.8)

## 2025-08-11 PROCEDURE — 97116 GAIT TRAINING THERAPY: CPT

## 2025-08-11 PROCEDURE — 85027 COMPLETE CBC AUTOMATED: CPT | Performed by: NURSE PRACTITIONER

## 2025-08-11 PROCEDURE — 93005 ELECTROCARDIOGRAM TRACING: CPT | Performed by: THORACIC SURGERY (CARDIOTHORACIC VASCULAR SURGERY)

## 2025-08-11 PROCEDURE — 25010000002 FUROSEMIDE PER 20 MG: Performed by: NURSE PRACTITIONER

## 2025-08-11 PROCEDURE — 63710000001 INSULIN LISPRO (HUMAN) PER 5 UNITS: Performed by: THORACIC SURGERY (CARDIOTHORACIC VASCULAR SURGERY)

## 2025-08-11 PROCEDURE — 93010 ELECTROCARDIOGRAM REPORT: CPT | Performed by: INTERNAL MEDICINE

## 2025-08-11 PROCEDURE — 25010000002 ENOXAPARIN PER 10 MG: Performed by: NURSE PRACTITIONER

## 2025-08-11 PROCEDURE — 82948 REAGENT STRIP/BLOOD GLUCOSE: CPT

## 2025-08-11 PROCEDURE — 25010000002 FUROSEMIDE PER 20 MG: Performed by: THORACIC SURGERY (CARDIOTHORACIC VASCULAR SURGERY)

## 2025-08-11 PROCEDURE — 80048 BASIC METABOLIC PNL TOTAL CA: CPT | Performed by: NURSE PRACTITIONER

## 2025-08-11 PROCEDURE — 71046 X-RAY EXAM CHEST 2 VIEWS: CPT

## 2025-08-11 RX ORDER — FUROSEMIDE 10 MG/ML
20 INJECTION INTRAMUSCULAR; INTRAVENOUS 3 TIMES DAILY
Status: DISCONTINUED | OUTPATIENT
Start: 2025-08-11 | End: 2025-08-12

## 2025-08-11 RX ORDER — POTASSIUM CHLORIDE 1500 MG/1
20 TABLET, EXTENDED RELEASE ORAL
Status: DISCONTINUED | OUTPATIENT
Start: 2025-08-11 | End: 2025-08-13 | Stop reason: HOSPADM

## 2025-08-11 RX ORDER — POTASSIUM CHLORIDE 1500 MG/1
20 TABLET, EXTENDED RELEASE ORAL
Status: DISCONTINUED | OUTPATIENT
Start: 2025-08-11 | End: 2025-08-11

## 2025-08-11 RX ORDER — FUROSEMIDE 10 MG/ML
20 INJECTION INTRAMUSCULAR; INTRAVENOUS 3 TIMES DAILY
Status: DISCONTINUED | OUTPATIENT
Start: 2025-08-12 | End: 2025-08-11

## 2025-08-11 RX ORDER — FUROSEMIDE 10 MG/ML
20 INJECTION INTRAMUSCULAR; INTRAVENOUS 3 TIMES DAILY
Status: DISCONTINUED | OUTPATIENT
Start: 2025-08-11 | End: 2025-08-11

## 2025-08-11 RX ADMIN — POTASSIUM CHLORIDE 20 MEQ: 1500 TABLET, EXTENDED RELEASE ORAL at 21:22

## 2025-08-11 RX ADMIN — ACETAMINOPHEN 1000 MG: 500 TABLET, FILM COATED ORAL at 10:28

## 2025-08-11 RX ADMIN — ACETAMINOPHEN 1000 MG: 500 TABLET, FILM COATED ORAL at 05:09

## 2025-08-11 RX ADMIN — PANTOPRAZOLE SODIUM 40 MG: 40 TABLET, DELAYED RELEASE ORAL at 05:09

## 2025-08-11 RX ADMIN — LIDOCAINE 2 PATCH: 4 PATCH TOPICAL at 18:27

## 2025-08-11 RX ADMIN — AMIODARONE HYDROCHLORIDE 400 MG: 200 TABLET ORAL at 21:05

## 2025-08-11 RX ADMIN — ACETAMINOPHEN 1000 MG: 500 TABLET, FILM COATED ORAL at 18:28

## 2025-08-11 RX ADMIN — CLOPIDOGREL BISULFATE 75 MG: 75 TABLET, FILM COATED ORAL at 18:28

## 2025-08-11 RX ADMIN — POTASSIUM CHLORIDE 20 MEQ: 1500 TABLET, EXTENDED RELEASE ORAL at 18:28

## 2025-08-11 RX ADMIN — METOPROLOL TARTRATE 50 MG: 50 TABLET, FILM COATED ORAL at 21:06

## 2025-08-11 RX ADMIN — FUROSEMIDE 20 MG: 10 INJECTION, SOLUTION INTRAMUSCULAR; INTRAVENOUS at 18:28

## 2025-08-11 RX ADMIN — ASPIRIN 81 MG: 81 TABLET, COATED ORAL at 08:37

## 2025-08-11 RX ADMIN — METOPROLOL TARTRATE 50 MG: 50 TABLET, FILM COATED ORAL at 08:38

## 2025-08-11 RX ADMIN — POTASSIUM CHLORIDE 20 MEQ: 1500 TABLET, EXTENDED RELEASE ORAL at 08:37

## 2025-08-11 RX ADMIN — INSULIN LISPRO 2 UNITS: 100 INJECTION, SOLUTION INTRAVENOUS; SUBCUTANEOUS at 13:06

## 2025-08-11 RX ADMIN — METHOCARBAMOL TABLETS 500 MG: 500 TABLET, COATED ORAL at 21:22

## 2025-08-11 RX ADMIN — METHOCARBAMOL TABLETS 500 MG: 500 TABLET, COATED ORAL at 14:38

## 2025-08-11 RX ADMIN — ENOXAPARIN SODIUM 40 MG: 100 INJECTION SUBCUTANEOUS at 08:36

## 2025-08-11 RX ADMIN — FUROSEMIDE 20 MG: 10 INJECTION, SOLUTION INTRAMUSCULAR; INTRAVENOUS at 08:37

## 2025-08-11 RX ADMIN — ATORVASTATIN CALCIUM 20 MG: 10 TABLET ORAL at 21:05

## 2025-08-11 RX ADMIN — AMIODARONE HYDROCHLORIDE 400 MG: 200 TABLET ORAL at 08:38

## 2025-08-11 RX ADMIN — TRAMADOL HYDROCHLORIDE 50 MG: 50 TABLET, COATED ORAL at 05:09

## 2025-08-11 RX ADMIN — METHOCARBAMOL TABLETS 500 MG: 500 TABLET, COATED ORAL at 05:09

## 2025-08-11 RX ADMIN — POTASSIUM CHLORIDE 20 MEQ: 1500 TABLET, EXTENDED RELEASE ORAL at 13:06

## 2025-08-11 RX ADMIN — AMIODARONE HYDROCHLORIDE 400 MG: 200 TABLET ORAL at 14:38

## 2025-08-11 RX ADMIN — FUROSEMIDE 20 MG: 10 INJECTION, SOLUTION INTRAMUSCULAR; INTRAVENOUS at 23:03

## 2025-08-12 ENCOUNTER — APPOINTMENT (OUTPATIENT)
Dept: GENERAL RADIOLOGY | Facility: HOSPITAL | Age: 65
DRG: 236 | End: 2025-08-12
Payer: MEDICARE

## 2025-08-12 LAB
ANION GAP SERPL CALCULATED.3IONS-SCNC: 12 MMOL/L (ref 5–15)
BUN SERPL-MCNC: 21.9 MG/DL (ref 8–23)
BUN/CREAT SERPL: 15.2 (ref 7–25)
CALCIUM SPEC-SCNC: 9.3 MG/DL (ref 8.6–10.5)
CHLORIDE SERPL-SCNC: 101 MMOL/L (ref 98–107)
CO2 SERPL-SCNC: 24 MMOL/L (ref 22–29)
CREAT SERPL-MCNC: 1.44 MG/DL (ref 0.76–1.27)
DEPRECATED RDW RBC AUTO: 47.6 FL (ref 37–54)
EGFRCR SERPLBLD CKD-EPI 2021: 53.6 ML/MIN/1.73
ERYTHROCYTE [DISTWIDTH] IN BLOOD BY AUTOMATED COUNT: 16 % (ref 12.3–15.4)
GLUCOSE BLDC GLUCOMTR-MCNC: 104 MG/DL (ref 70–130)
GLUCOSE BLDC GLUCOMTR-MCNC: 112 MG/DL (ref 70–130)
GLUCOSE BLDC GLUCOMTR-MCNC: 124 MG/DL (ref 70–130)
GLUCOSE BLDC GLUCOMTR-MCNC: 143 MG/DL (ref 70–130)
GLUCOSE SERPL-MCNC: 110 MG/DL (ref 65–99)
HCT VFR BLD AUTO: 28 % (ref 37.5–51)
HGB BLD-MCNC: 8.8 G/DL (ref 13–17.7)
MCH RBC QN AUTO: 29.2 PG (ref 26.6–33)
MCHC RBC AUTO-ENTMCNC: 31.4 G/DL (ref 31.5–35.7)
MCV RBC AUTO: 93 FL (ref 79–97)
PLATELET # BLD AUTO: 397 10*3/MM3 (ref 140–450)
PMV BLD AUTO: 10.2 FL (ref 6–12)
POTASSIUM SERPL-SCNC: 4.2 MMOL/L (ref 3.5–5.2)
QT INTERVAL: 402 MS
QTC INTERVAL: 448 MS
RBC # BLD AUTO: 3.01 10*6/MM3 (ref 4.14–5.8)
SODIUM SERPL-SCNC: 137 MMOL/L (ref 136–145)
WBC NRBC COR # BLD AUTO: 9.78 10*3/MM3 (ref 3.4–10.8)

## 2025-08-12 PROCEDURE — 85027 COMPLETE CBC AUTOMATED: CPT | Performed by: NURSE PRACTITIONER

## 2025-08-12 PROCEDURE — 25010000002 FUROSEMIDE PER 20 MG: Performed by: NURSE PRACTITIONER

## 2025-08-12 PROCEDURE — 25010000002 ENOXAPARIN PER 10 MG: Performed by: NURSE PRACTITIONER

## 2025-08-12 PROCEDURE — 71046 X-RAY EXAM CHEST 2 VIEWS: CPT

## 2025-08-12 PROCEDURE — 80048 BASIC METABOLIC PNL TOTAL CA: CPT | Performed by: NURSE PRACTITIONER

## 2025-08-12 PROCEDURE — 82948 REAGENT STRIP/BLOOD GLUCOSE: CPT

## 2025-08-12 PROCEDURE — 82948 REAGENT STRIP/BLOOD GLUCOSE: CPT | Performed by: THORACIC SURGERY (CARDIOTHORACIC VASCULAR SURGERY)

## 2025-08-12 PROCEDURE — 97116 GAIT TRAINING THERAPY: CPT

## 2025-08-12 RX ORDER — FUROSEMIDE 10 MG/ML
20 INJECTION INTRAMUSCULAR; INTRAVENOUS
Status: DISCONTINUED | OUTPATIENT
Start: 2025-08-12 | End: 2025-08-13 | Stop reason: HOSPADM

## 2025-08-12 RX ADMIN — METHOCARBAMOL TABLETS 500 MG: 500 TABLET, COATED ORAL at 05:25

## 2025-08-12 RX ADMIN — POTASSIUM CHLORIDE 20 MEQ: 1500 TABLET, EXTENDED RELEASE ORAL at 09:42

## 2025-08-12 RX ADMIN — ENOXAPARIN SODIUM 40 MG: 100 INJECTION SUBCUTANEOUS at 09:42

## 2025-08-12 RX ADMIN — CLOPIDOGREL BISULFATE 75 MG: 75 TABLET, FILM COATED ORAL at 18:22

## 2025-08-12 RX ADMIN — AMIODARONE HYDROCHLORIDE 400 MG: 200 TABLET ORAL at 09:42

## 2025-08-12 RX ADMIN — METHOCARBAMOL TABLETS 500 MG: 500 TABLET, COATED ORAL at 16:14

## 2025-08-12 RX ADMIN — ACETAMINOPHEN 1000 MG: 500 TABLET, FILM COATED ORAL at 05:55

## 2025-08-12 RX ADMIN — FUROSEMIDE 20 MG: 10 INJECTION, SOLUTION INTRAMUSCULAR; INTRAVENOUS at 11:49

## 2025-08-12 RX ADMIN — ACETAMINOPHEN 1000 MG: 500 TABLET, FILM COATED ORAL at 09:42

## 2025-08-12 RX ADMIN — ASPIRIN 81 MG: 81 TABLET, COATED ORAL at 09:42

## 2025-08-12 RX ADMIN — AMIODARONE HYDROCHLORIDE 400 MG: 200 TABLET ORAL at 21:17

## 2025-08-12 RX ADMIN — METOPROLOL TARTRATE 50 MG: 50 TABLET, FILM COATED ORAL at 09:42

## 2025-08-12 RX ADMIN — ACETAMINOPHEN 1000 MG: 500 TABLET, FILM COATED ORAL at 16:10

## 2025-08-12 RX ADMIN — FUROSEMIDE 20 MG: 10 INJECTION, SOLUTION INTRAMUSCULAR; INTRAVENOUS at 09:42

## 2025-08-12 RX ADMIN — POTASSIUM CHLORIDE 20 MEQ: 1500 TABLET, EXTENDED RELEASE ORAL at 18:22

## 2025-08-12 RX ADMIN — POTASSIUM CHLORIDE 20 MEQ: 1500 TABLET, EXTENDED RELEASE ORAL at 11:49

## 2025-08-12 RX ADMIN — METOPROLOL TARTRATE 50 MG: 50 TABLET, FILM COATED ORAL at 21:17

## 2025-08-12 RX ADMIN — ATORVASTATIN CALCIUM 20 MG: 10 TABLET ORAL at 21:17

## 2025-08-12 RX ADMIN — FUROSEMIDE 20 MG: 10 INJECTION, SOLUTION INTRAMUSCULAR; INTRAVENOUS at 18:22

## 2025-08-12 RX ADMIN — AMIODARONE HYDROCHLORIDE 400 MG: 200 TABLET ORAL at 16:10

## 2025-08-12 RX ADMIN — METHOCARBAMOL TABLETS 500 MG: 500 TABLET, COATED ORAL at 21:17

## 2025-08-12 RX ADMIN — LIDOCAINE 2 PATCH: 4 PATCH TOPICAL at 16:10

## 2025-08-13 ENCOUNTER — READMISSION MANAGEMENT (OUTPATIENT)
Dept: CALL CENTER | Facility: HOSPITAL | Age: 65
End: 2025-08-13
Payer: MEDICARE

## 2025-08-13 ENCOUNTER — APPOINTMENT (OUTPATIENT)
Dept: GENERAL RADIOLOGY | Facility: HOSPITAL | Age: 65
DRG: 236 | End: 2025-08-13
Payer: MEDICARE

## 2025-08-13 VITALS
HEIGHT: 70 IN | SYSTOLIC BLOOD PRESSURE: 121 MMHG | WEIGHT: 245.4 LBS | BODY MASS INDEX: 35.13 KG/M2 | HEART RATE: 73 BPM | RESPIRATION RATE: 16 BRPM | DIASTOLIC BLOOD PRESSURE: 54 MMHG | TEMPERATURE: 98.3 F | OXYGEN SATURATION: 96 %

## 2025-08-13 LAB
ANION GAP SERPL CALCULATED.3IONS-SCNC: 15 MMOL/L (ref 5–15)
BUN SERPL-MCNC: 21.7 MG/DL (ref 8–23)
BUN/CREAT SERPL: 11.9 (ref 7–25)
CALCIUM SPEC-SCNC: 9.5 MG/DL (ref 8.6–10.5)
CHLORIDE SERPL-SCNC: 101 MMOL/L (ref 98–107)
CO2 SERPL-SCNC: 22 MMOL/L (ref 22–29)
CREAT SERPL-MCNC: 1.83 MG/DL (ref 0.76–1.27)
DEPRECATED RDW RBC AUTO: 50.6 FL (ref 37–54)
EGFRCR SERPLBLD CKD-EPI 2021: 40.2 ML/MIN/1.73
ERYTHROCYTE [DISTWIDTH] IN BLOOD BY AUTOMATED COUNT: 16.1 % (ref 12.3–15.4)
GLUCOSE BLDC GLUCOMTR-MCNC: 107 MG/DL (ref 70–130)
GLUCOSE BLDC GLUCOMTR-MCNC: 114 MG/DL (ref 70–130)
GLUCOSE SERPL-MCNC: 121 MG/DL (ref 65–99)
HCT VFR BLD AUTO: 31.2 % (ref 37.5–51)
HGB BLD-MCNC: 9.8 G/DL (ref 13–17.7)
MCH RBC QN AUTO: 29.9 PG (ref 26.6–33)
MCHC RBC AUTO-ENTMCNC: 31.4 G/DL (ref 31.5–35.7)
MCV RBC AUTO: 95.1 FL (ref 79–97)
PLATELET # BLD AUTO: 524 10*3/MM3 (ref 140–450)
PMV BLD AUTO: 10.3 FL (ref 6–12)
POTASSIUM SERPL-SCNC: 4.1 MMOL/L (ref 3.5–5.2)
RBC # BLD AUTO: 3.28 10*6/MM3 (ref 4.14–5.8)
SODIUM SERPL-SCNC: 138 MMOL/L (ref 136–145)
WBC NRBC COR # BLD AUTO: 12.42 10*3/MM3 (ref 3.4–10.8)

## 2025-08-13 PROCEDURE — 85027 COMPLETE CBC AUTOMATED: CPT | Performed by: NURSE PRACTITIONER

## 2025-08-13 PROCEDURE — 25010000002 ENOXAPARIN PER 10 MG: Performed by: NURSE PRACTITIONER

## 2025-08-13 PROCEDURE — 71046 X-RAY EXAM CHEST 2 VIEWS: CPT

## 2025-08-13 PROCEDURE — 82948 REAGENT STRIP/BLOOD GLUCOSE: CPT | Performed by: THORACIC SURGERY (CARDIOTHORACIC VASCULAR SURGERY)

## 2025-08-13 PROCEDURE — 82948 REAGENT STRIP/BLOOD GLUCOSE: CPT

## 2025-08-13 PROCEDURE — 80048 BASIC METABOLIC PNL TOTAL CA: CPT | Performed by: NURSE PRACTITIONER

## 2025-08-13 PROCEDURE — 97116 GAIT TRAINING THERAPY: CPT

## 2025-08-13 RX ORDER — LOSARTAN POTASSIUM 25 MG/1
25 TABLET ORAL DAILY
Qty: 30 TABLET | Refills: 2 | Status: SHIPPED | OUTPATIENT
Start: 2025-08-13

## 2025-08-13 RX ORDER — ATORVASTATIN CALCIUM 20 MG/1
20 TABLET, FILM COATED ORAL NIGHTLY
Qty: 30 TABLET | Refills: 2 | Status: SHIPPED | OUTPATIENT
Start: 2025-08-13

## 2025-08-13 RX ORDER — METOPROLOL TARTRATE 50 MG
50 TABLET ORAL EVERY 12 HOURS SCHEDULED
Qty: 60 TABLET | Refills: 2 | Status: SHIPPED | OUTPATIENT
Start: 2025-08-13

## 2025-08-13 RX ORDER — CLOPIDOGREL BISULFATE 75 MG/1
75 TABLET ORAL DAILY
Qty: 30 TABLET | Refills: 2 | Status: SHIPPED | OUTPATIENT
Start: 2025-08-13

## 2025-08-13 RX ORDER — AMIODARONE HYDROCHLORIDE 200 MG/1
400 TABLET ORAL DAILY
Qty: 60 TABLET | Refills: 0 | Status: SHIPPED | OUTPATIENT
Start: 2025-08-13

## 2025-08-13 RX ADMIN — ACETAMINOPHEN 1000 MG: 500 TABLET, FILM COATED ORAL at 10:23

## 2025-08-13 RX ADMIN — AMIODARONE HYDROCHLORIDE 400 MG: 200 TABLET ORAL at 14:44

## 2025-08-13 RX ADMIN — ACETAMINOPHEN 1000 MG: 500 TABLET, FILM COATED ORAL at 03:37

## 2025-08-13 RX ADMIN — METHOCARBAMOL TABLETS 500 MG: 500 TABLET, COATED ORAL at 14:44

## 2025-08-13 RX ADMIN — METOPROLOL TARTRATE 50 MG: 50 TABLET, FILM COATED ORAL at 10:23

## 2025-08-13 RX ADMIN — AMIODARONE HYDROCHLORIDE 400 MG: 200 TABLET ORAL at 10:23

## 2025-08-13 RX ADMIN — ENOXAPARIN SODIUM 40 MG: 100 INJECTION SUBCUTANEOUS at 10:24

## 2025-08-13 RX ADMIN — ASPIRIN 81 MG: 81 TABLET, COATED ORAL at 10:23

## 2025-08-13 RX ADMIN — METHOCARBAMOL TABLETS 500 MG: 500 TABLET, COATED ORAL at 06:36

## 2025-08-15 ENCOUNTER — TELEPHONE (OUTPATIENT)
Dept: CARDIAC SURGERY | Facility: CLINIC | Age: 65
End: 2025-08-15
Payer: MEDICARE

## 2025-08-15 LAB
QT INTERVAL: 412 MS
QTC INTERVAL: 453 MS

## 2025-08-20 ENCOUNTER — RESULTS FOLLOW-UP (OUTPATIENT)
Dept: TELEMETRY | Facility: HOSPITAL | Age: 65
End: 2025-08-20
Payer: MEDICARE

## 2025-08-20 ENCOUNTER — HOSPITAL ENCOUNTER (OUTPATIENT)
Dept: GENERAL RADIOLOGY | Facility: HOSPITAL | Age: 65
Discharge: HOME | End: 2025-08-20
Payer: MEDICARE

## 2025-08-20 ENCOUNTER — LAB (OUTPATIENT)
Dept: LAB | Facility: HOSPITAL | Age: 65
End: 2025-08-20
Payer: MEDICARE

## 2025-08-20 ENCOUNTER — OFFICE VISIT (OUTPATIENT)
Dept: CARDIAC SURGERY | Facility: CLINIC | Age: 65
End: 2025-08-20
Payer: MEDICARE

## 2025-08-20 VITALS
SYSTOLIC BLOOD PRESSURE: 106 MMHG | DIASTOLIC BLOOD PRESSURE: 64 MMHG | HEART RATE: 83 BPM | BODY MASS INDEX: 33.99 KG/M2 | OXYGEN SATURATION: 100 % | HEIGHT: 70 IN | WEIGHT: 237.4 LBS

## 2025-08-20 DIAGNOSIS — I25.118 CORONARY ARTERY DISEASE OF NATIVE ARTERY OF NATIVE HEART WITH STABLE ANGINA PECTORIS: ICD-10-CM

## 2025-08-20 DIAGNOSIS — Z87.891 FORMER SMOKER: ICD-10-CM

## 2025-08-20 DIAGNOSIS — R91.1 LUNG NODULE: ICD-10-CM

## 2025-08-20 DIAGNOSIS — I48.0 PAF (PAROXYSMAL ATRIAL FIBRILLATION): ICD-10-CM

## 2025-08-20 DIAGNOSIS — N18.31 STAGE 3A CHRONIC KIDNEY DISEASE: ICD-10-CM

## 2025-08-20 DIAGNOSIS — E66.812 CLASS 2 OBESITY: ICD-10-CM

## 2025-08-20 DIAGNOSIS — I25.118 CORONARY ARTERY DISEASE OF NATIVE ARTERY OF NATIVE HEART WITH STABLE ANGINA PECTORIS: Primary | ICD-10-CM

## 2025-08-20 PROBLEM — I25.10 CAD (CORONARY ARTERY DISEASE): Status: RESOLVED | Noted: 2025-08-04 | Resolved: 2025-08-20

## 2025-08-20 PROBLEM — I25.10 CORONARY ARTERY DISEASE INVOLVING NATIVE CORONARY ARTERY OF NATIVE HEART: Status: RESOLVED | Noted: 2025-07-30 | Resolved: 2025-08-20

## 2025-08-20 LAB
ANION GAP SERPL CALCULATED.3IONS-SCNC: 12 MMOL/L (ref 5–15)
BASOPHILS # BLD AUTO: 0.05 10*3/MM3 (ref 0–0.2)
BASOPHILS NFR BLD AUTO: 0.5 % (ref 0–1.5)
BUN SERPL-MCNC: 16.1 MG/DL (ref 8–23)
BUN/CREAT SERPL: 12.1 (ref 7–25)
CALCIUM SPEC-SCNC: 9.7 MG/DL (ref 8.6–10.5)
CHLORIDE SERPL-SCNC: 103 MMOL/L (ref 98–107)
CO2 SERPL-SCNC: 21 MMOL/L (ref 22–29)
CREAT SERPL-MCNC: 1.33 MG/DL (ref 0.76–1.27)
DEPRECATED RDW RBC AUTO: 51.3 FL (ref 37–54)
EGFRCR SERPLBLD CKD-EPI 2021: 59 ML/MIN/1.73
EOSINOPHIL # BLD AUTO: 0.16 10*3/MM3 (ref 0–0.4)
EOSINOPHIL NFR BLD AUTO: 1.6 % (ref 0.3–6.2)
ERYTHROCYTE [DISTWIDTH] IN BLOOD BY AUTOMATED COUNT: 15.1 % (ref 12.3–15.4)
GLUCOSE SERPL-MCNC: 111 MG/DL (ref 65–99)
HCT VFR BLD AUTO: 34.7 % (ref 37.5–51)
HGB BLD-MCNC: 10.7 G/DL (ref 13–17.7)
IMM GRANULOCYTES # BLD AUTO: 0.05 10*3/MM3 (ref 0–0.05)
IMM GRANULOCYTES NFR BLD AUTO: 0.5 % (ref 0–0.5)
LYMPHOCYTES # BLD AUTO: 1.69 10*3/MM3 (ref 0.7–3.1)
LYMPHOCYTES NFR BLD AUTO: 17.4 % (ref 19.6–45.3)
MCH RBC QN AUTO: 29.4 PG (ref 26.6–33)
MCHC RBC AUTO-ENTMCNC: 30.8 G/DL (ref 31.5–35.7)
MCV RBC AUTO: 95.3 FL (ref 79–97)
MONOCYTES # BLD AUTO: 0.71 10*3/MM3 (ref 0.1–0.9)
MONOCYTES NFR BLD AUTO: 7.3 % (ref 5–12)
NEUTROPHILS NFR BLD AUTO: 7.04 10*3/MM3 (ref 1.7–7)
NEUTROPHILS NFR BLD AUTO: 72.7 % (ref 42.7–76)
NRBC BLD AUTO-RTO: 0 /100 WBC (ref 0–0.2)
PLATELET # BLD AUTO: 677 10*3/MM3 (ref 140–450)
PMV BLD AUTO: 9.2 FL (ref 6–12)
POTASSIUM SERPL-SCNC: 5.2 MMOL/L (ref 3.5–5.2)
RBC # BLD AUTO: 3.64 10*6/MM3 (ref 4.14–5.8)
SODIUM SERPL-SCNC: 136 MMOL/L (ref 136–145)
WBC NRBC COR # BLD AUTO: 9.7 10*3/MM3 (ref 3.4–10.8)

## 2025-08-20 PROCEDURE — 80048 BASIC METABOLIC PNL TOTAL CA: CPT

## 2025-08-20 PROCEDURE — 71046 X-RAY EXAM CHEST 2 VIEWS: CPT

## 2025-08-20 PROCEDURE — 85025 COMPLETE CBC W/AUTO DIFF WBC: CPT

## 2025-08-20 PROCEDURE — 36415 COLL VENOUS BLD VENIPUNCTURE: CPT

## 2025-08-21 ENCOUNTER — READMISSION MANAGEMENT (OUTPATIENT)
Dept: CALL CENTER | Facility: HOSPITAL | Age: 65
End: 2025-08-21
Payer: MEDICARE

## 2025-08-21 DIAGNOSIS — I25.118 CORONARY ARTERY DISEASE OF NATIVE ARTERY OF NATIVE HEART WITH STABLE ANGINA PECTORIS: Primary | ICD-10-CM

## (undated) DEVICE — SUT SILK 2/0 FS BLK 18IN 685G

## (undated) DEVICE — RESERVOIR,SUCTION,100CC,SILICONE: Brand: MEDLINE

## (undated) DEVICE — BG OR ZSUT SADDLE 20IN CLR STRL

## (undated) DEVICE — KIT,ANTI FOG,W/SPONGE & FLUID,SOFT PACK: Brand: MEDLINE

## (undated) DEVICE — 6 FOOT DISPOSABLE EXTENSION CABLE WITH SAFE CONNECT / SCREW-DOWN

## (undated) DEVICE — ST TBG PNEUMOCLEAR EVAC SMOKE HIFLO

## (undated) DEVICE — PK OPN HEART 30

## (undated) DEVICE — TB SXN SURG DLP MALL/CARD SFT/TP 6F 6IN

## (undated) DEVICE — ELECTRD BLD EZ CLN STD 2.5IN

## (undated) DEVICE — GLV SURG BIOGEL LTX PF 7 1/2

## (undated) DEVICE — CLTH CLENS READYCLEANSE PERI CARE PK/5

## (undated) DEVICE — BLD STERNALOCK XP ZDRIVE

## (undated) DEVICE — STRIP,CLOSURE,WOUND,MEDI-STRIP,1/2X4: Brand: MEDLINE

## (undated) DEVICE — BLAKE SILICONE DRAIN, 19 FR ROUND, HUBLESS WITH 1/4" BENDABLE TROCAR: Brand: BLAKE

## (undated) DEVICE — E-PACK PROCEDURE KIT: Brand: E-PACK

## (undated) DEVICE — SYS VASOVIEW2 HEMOPRO ENDOSCOPIC HARVST VESL

## (undated) DEVICE — TRAP FLD MINIVAC MEGADYNE 100ML

## (undated) DEVICE — 1/2 FORCE SURGICAL SPRING CLIP: Brand: STEALTH® SPRING CLIP

## (undated) DEVICE — ROTATING SURGICAL PUNCHES, 1 PER POUCH: Brand: A&E MEDICAL / ROTATING SURGICAL PUNCHES

## (undated) DEVICE — KT CATH ARTERIAL LINE FEM 18G

## (undated) DEVICE — Device

## (undated) DEVICE — Device: Brand: ZDRIVE™

## (undated) DEVICE — BLD SCLPL BEAVR MINI STR 2BVL 180D LF

## (undated) DEVICE — SUT ETHIB 2/0 RB1 DA 30IN WHT MX523

## (undated) DEVICE — SYS DISTNTION VEIN BONCHEK 300MMHG

## (undated) DEVICE — BLAKE SILICONE DRAINS CARDIO CONNECTOR 2:1: Brand: BLAKE

## (undated) DEVICE — ADHS LIQ MASTISOL 2/3ML

## (undated) DEVICE — STERNUM BLADE, OFFSET (32.0 X 0.8 X 6.3MM)

## (undated) DEVICE — PREVENA INCISION MANAGEMENT SYSTEM- PEEL & PLACE DRESSING: Brand: PREVENA™ PEEL & PLACE™

## (undated) DEVICE — PK SET UP ANES OPN HEART 30

## (undated) DEVICE — SCANLAN® SURG-I-PAW® INSTRUMENT COVERS - RED, 1/10" X 5"/ 3 MMX13 CM (2 - 5" PCS /PKG): Brand: SCANLAN® SURG-I-PAW® INSTRUMENT COVERS

## (undated) DEVICE — PATIENT RETURN ELECTRODE, SINGLE-USE, CONTACT QUALITY MONITORING, ADULT, WITH 9FT CORD, FOR PATIENTS WEIGING OVER 33LBS. (15KG): Brand: MEGADYNE

## (undated) DEVICE — DRSNG SURESITE123 8X12IN